# Patient Record
Sex: MALE | Race: WHITE | NOT HISPANIC OR LATINO | Employment: OTHER | URBAN - METROPOLITAN AREA
[De-identification: names, ages, dates, MRNs, and addresses within clinical notes are randomized per-mention and may not be internally consistent; named-entity substitution may affect disease eponyms.]

---

## 2017-05-02 ENCOUNTER — LAB VISIT (OUTPATIENT)
Dept: LAB | Facility: HOSPITAL | Age: 57
End: 2017-05-02
Attending: INTERNAL MEDICINE
Payer: OTHER GOVERNMENT

## 2017-05-02 DIAGNOSIS — D64.9 ANEMIA, UNSPECIFIED TYPE: ICD-10-CM

## 2017-05-02 LAB
ALBUMIN SERPL BCP-MCNC: 4.9 G/DL
ALP SERPL-CCNC: 89 U/L
ALT SERPL W/O P-5'-P-CCNC: 51 U/L
ANION GAP SERPL CALC-SCNC: 10 MMOL/L
AST SERPL-CCNC: 41 U/L
BASOPHILS # BLD AUTO: 0.05 K/UL
BASOPHILS NFR BLD: 0.7 %
BILIRUB SERPL-MCNC: 0.7 MG/DL
BUN SERPL-MCNC: 12 MG/DL
CALCIUM SERPL-MCNC: 9.4 MG/DL
CHLORIDE SERPL-SCNC: 104 MMOL/L
CO2 SERPL-SCNC: 29 MMOL/L
CREAT SERPL-MCNC: 0.8 MG/DL
DIFFERENTIAL METHOD: ABNORMAL
EOSINOPHIL # BLD AUTO: 0.3 K/UL
EOSINOPHIL NFR BLD: 3.8 %
ERYTHROCYTE [DISTWIDTH] IN BLOOD BY AUTOMATED COUNT: 12.6 %
EST. GFR  (AFRICAN AMERICAN): >60 ML/MIN/1.73 M^2
EST. GFR  (NON AFRICAN AMERICAN): >60 ML/MIN/1.73 M^2
GLUCOSE SERPL-MCNC: 105 MG/DL
HCT VFR BLD AUTO: 39.3 %
HGB BLD-MCNC: 13.8 G/DL
LYMPHOCYTES # BLD AUTO: 2.9 K/UL
LYMPHOCYTES NFR BLD: 42.8 %
MCH RBC QN AUTO: 33.6 PG
MCHC RBC AUTO-ENTMCNC: 35.1 %
MCV RBC AUTO: 96 FL
MONOCYTES # BLD AUTO: 0.5 K/UL
MONOCYTES NFR BLD: 7 %
NEUTROPHILS # BLD AUTO: 3.1 K/UL
NEUTROPHILS NFR BLD: 45.7 %
NRBC BLD-RTO: 0 /100 WBC
PLATELET # BLD AUTO: 246 K/UL
PMV BLD AUTO: 8.7 FL
POTASSIUM SERPL-SCNC: 4.1 MMOL/L
PROT SERPL-MCNC: 7.9 G/DL
RBC # BLD AUTO: 4.11 M/UL
SODIUM SERPL-SCNC: 143 MMOL/L
URATE SERPL-MCNC: 6.8 MG/DL
WBC # BLD AUTO: 6.83 K/UL

## 2017-05-02 PROCEDURE — 80053 COMPREHEN METABOLIC PANEL: CPT | Mod: PN

## 2017-05-02 PROCEDURE — 83520 IMMUNOASSAY QUANT NOS NONAB: CPT

## 2017-05-02 PROCEDURE — 85025 COMPLETE CBC W/AUTO DIFF WBC: CPT | Mod: PN

## 2017-05-02 PROCEDURE — 84165 PROTEIN E-PHORESIS SERUM: CPT

## 2017-05-02 PROCEDURE — 84550 ASSAY OF BLOOD/URIC ACID: CPT

## 2017-05-02 PROCEDURE — 36415 COLL VENOUS BLD VENIPUNCTURE: CPT | Mod: PN

## 2017-05-02 PROCEDURE — 85025 COMPLETE CBC W/AUTO DIFF WBC: CPT

## 2017-05-02 PROCEDURE — 80053 COMPREHEN METABOLIC PANEL: CPT

## 2017-05-02 PROCEDURE — 82784 ASSAY IGA/IGD/IGG/IGM EACH: CPT | Mod: 59

## 2017-05-02 PROCEDURE — 84165 PROTEIN E-PHORESIS SERUM: CPT | Mod: 26,,, | Performed by: PATHOLOGY

## 2017-05-02 PROCEDURE — 84550 ASSAY OF BLOOD/URIC ACID: CPT | Mod: PN

## 2017-05-03 LAB
ALBUMIN SERPL ELPH-MCNC: 4.64 G/DL
ALPHA1 GLOB SERPL ELPH-MCNC: 0.22 G/DL
ALPHA2 GLOB SERPL ELPH-MCNC: 0.68 G/DL
B-GLOBULIN SERPL ELPH-MCNC: 0.78 G/DL
GAMMA GLOB SERPL ELPH-MCNC: 1.19 G/DL
IGA SERPL-MCNC: 163 MG/DL
IGG SERPL-MCNC: 1195 MG/DL
IGM SERPL-MCNC: 115 MG/DL
KAPPA LC SER QL IA: 1.41 MG/DL
KAPPA LC/LAMBDA SER IA: 1.37
LAMBDA LC SER QL IA: 1.03 MG/DL
PROT SERPL-MCNC: 7.5 G/DL

## 2017-05-04 LAB — PATHOLOGIST INTERPRETATION SPE: NORMAL

## 2017-05-26 ENCOUNTER — TELEPHONE (OUTPATIENT)
Dept: GASTROENTEROLOGY | Facility: CLINIC | Age: 57
End: 2017-05-26

## 2017-05-26 NOTE — TELEPHONE ENCOUNTER
Spoke with pt's wife, Sailaja. Sailaja states pt received recall letter in mail for scopes. Informed that Annita is the nurse who schedules the scopes for Dr. Crenshaw is out of office today and that once she returns she will get a call back to schedule. Sailaja verbalized understanding.     Please call Sailaja at [phone] to schedule scopes for pt.

## 2017-05-26 NOTE — TELEPHONE ENCOUNTER
----- Message from Bar Garcia sent at 5/26/2017  9:17 AM CDT -----  Contact: pt's wife Sailaja Mederos needs to schedule a colonoscopy and EGD  Call Back#[phone]  Thanks

## 2017-10-27 PROBLEM — L84 CALLUS OF FOOT: Status: ACTIVE | Noted: 2017-10-27

## 2017-10-27 PROBLEM — B35.1 ONYCHOMYCOSIS DUE TO DERMATOPHYTE: Status: ACTIVE | Noted: 2017-10-27

## 2017-11-11 PROBLEM — E83.52 HYPERCALCEMIA: Status: ACTIVE | Noted: 2017-11-11

## 2018-01-12 PROBLEM — K76.0 FATTY LIVER: Status: ACTIVE | Noted: 2018-01-12

## 2018-03-21 ENCOUNTER — TELEPHONE (OUTPATIENT)
Dept: GASTROENTEROLOGY | Facility: CLINIC | Age: 58
End: 2018-03-21

## 2018-03-21 ENCOUNTER — PATIENT MESSAGE (OUTPATIENT)
Dept: GASTROENTEROLOGY | Facility: CLINIC | Age: 58
End: 2018-03-21

## 2018-03-21 NOTE — TELEPHONE ENCOUNTER
----- Message from Bassam Valdez sent at 3/21/2018 12:17 PM CDT -----  Contact: Wife/Sailaja  Sailaja called in regarding the attached patient () and stated they received notification in the mail that it was time, in April, to scheduled patients EGD & colonoscopy.  Sailaja's call back number is [phone]

## 2018-03-28 ENCOUNTER — PATIENT MESSAGE (OUTPATIENT)
Dept: GASTROENTEROLOGY | Facility: CLINIC | Age: 58
End: 2018-03-28

## 2018-03-28 ENCOUNTER — TELEPHONE (OUTPATIENT)
Dept: GASTROENTEROLOGY | Facility: CLINIC | Age: 58
End: 2018-03-28

## 2018-03-28 NOTE — TELEPHONE ENCOUNTER
----- Message from Viry Reveles sent at 3/28/2018 12:36 PM CDT -----  Contact: Sailaja  Calling to get a colonoscopy and egd before the end of April. They will be moving. She is asking for a call [phone] and [phone]

## 2018-03-28 NOTE — TELEPHONE ENCOUNTER
----- Message from Viry Reveles sent at 3/28/2018 12:36 PM CDT -----  Contact: Sailaja  Calling to get a colonoscopy and egd before the end of April. They will be moving. She is asking for a call [phone] and [phone]

## 2018-04-03 ENCOUNTER — PATIENT MESSAGE (OUTPATIENT)
Dept: GASTROENTEROLOGY | Facility: CLINIC | Age: 58
End: 2018-04-03

## 2018-04-04 ENCOUNTER — PATIENT MESSAGE (OUTPATIENT)
Dept: ENDOSCOPY | Facility: HOSPITAL | Age: 58
End: 2018-04-04

## 2018-05-07 ENCOUNTER — PATIENT MESSAGE (OUTPATIENT)
Dept: GASTROENTEROLOGY | Facility: CLINIC | Age: 58
End: 2018-05-07

## 2018-05-07 ENCOUNTER — PATIENT MESSAGE (OUTPATIENT)
Dept: ENDOSCOPY | Facility: HOSPITAL | Age: 58
End: 2018-05-07

## 2018-05-22 ENCOUNTER — TELEPHONE (OUTPATIENT)
Dept: GASTROENTEROLOGY | Facility: CLINIC | Age: 58
End: 2018-05-22

## 2018-05-22 NOTE — TELEPHONE ENCOUNTER
Received approval for pt to hold Effient from Dr Cade's office.  Note says this was discussed with pt

## 2018-05-23 NOTE — H&P
History & Physical - Short Stay  Gastroenterology      SUBJECTIVE:     Procedure: Gastroscopy and Colonoscopy    Chief Complaint/Indication for Procedure:  Dysphagia.  Hx of colon polyps.    History of Present Illness:  Todd Lobo LPN      3/21/18 3:06 PM   Note      ----- Message from Bassam Valdez sent at 3/21/2018 12:17 PM CDT -----  Contact: Wife/Sailaja  Sailaja called in regarding the attached patient () and stated they received notification in the mail that it was time, in April, to scheduled patients EGD & colonoscopy.  Sailaja's call back number is [phone]          Adair Leigh   to Ahmetservando NAVARRO MATHEW Lobo      3/22/2018  10:33 AM   Sorry I missed your call. Please try my wifes phone at [phone]. I have a history of esophageal stricture and have been having some intermittent problems with swallowing. Would like to have EGD with dilitaton at the same time as colonoscopy.  Thanks, Fam         Office Visit     [facility]11/2018  [facility]   Controlled type 2 diabetes mellitus without complication, without long-term current use of insulin +6 more   Dx   Follow-up ; Referred by Aaareferral Self   Reason for Visit    Progress Notes        Subjective:       Patient ID: Adair Leigh is a 57 y.o. male.     Chief Complaint: Follow-up (6 month f/u Hx : DM II)     HPI   Patient here for follow up. Planning to travel extensively in . Gets muscle cramps. On atorvastatin and co q 10.     Assessment:       1. Controlled type 2 diabetes mellitus without complication, without long-term current use of insulin    2. Essential hypertension    3. Fatty liver    4. Gastroesophageal reflux disease without esophagitis    5. Coronary artery disease involving native heart without angina pectoris, unspecified vessel or lesion type    6. Esophageal dysphagia        Plan:       Controlled type 2 diabetes mellitus without complication, without long-term  current use of insulin     Essential hypertension     Fatty liver     Gastroesophageal reflux disease without esophagitis     Coronary artery disease involving native heart without angina pectoris, unspecified vessel or lesion type     Esophageal dysphagia     Muscle cramps            Discussed management of dm, of rx , supplies and labs while traveling in . Explained the muscle cramps could be due to statin. Trial of statin period observation discussed.               PTA Medications   Medication Sig    atenolol (TENORMIN) 50 MG tablet TAKE 1 TABLET DAILY    atorvastatin (LIPITOR) 40 MG tablet TAKE 1 TABLET DAILY    chromium picolinate 1,000 mcg Tab Take 1 tablet by mouth.    ezetimibe (ZETIA) 10 mg tablet TAKE 1 TABLET DAILY    folic acid (FOLVITE) 400 MCG tablet Take 400 mcg by mouth once daily.    ibuprofen (ADVIL,MOTRIN) 800 MG tablet TAKE 1 TABLET EVERY 8 HOURS AS NEEDED FOR PAIN    omega-3 acid ethyl esters (LOVAZA) 1 gram capsule TAKE 4 CAPSULES DAILY    selenium 50 mcg Tab Take 200 mcg by mouth once daily.    amlodipine-valsartan (EXFORGE)  mg per tablet TAKE 1 TABLET DAILY    blood sugar diagnostic (FREESTYLE LITE STRIPS) Strp 1 strip by Misc.(Non-Drug; Combo Route) route 2 (two) times daily.    EFFIENT 10 mg Tab TAKE 1 TABLET DAILY    nitroGLYCERIN (NITROSTAT) 0.4 MG SL tablet Place 0.4 mg under the tongue every 5 (five) minutes as needed.     SITagliptin (JANUVIA) 50 MG Tab Take 1 tablet (50 mg total) by mouth once daily.    VIAGRA 100 mg tablet TAKE 1 TABLET DAILY AS NEEDED FOR ERECTILE DYSFUNCTION    zolpidem (AMBIEN) 10 mg Tab Take 1 tablet (10 mg total) by mouth nightly as needed.       Review of patient's allergies indicates:   Allergen Reactions    Iodinated contrast- oral and iv dye Hives    Plavix [clopidogrel] Hives        Past Medical History:   Diagnosis Date    Coronary artery disease     Diabetes mellitus     Family history of coronary artery disease 3/30/2015     Hypertension     S/P CABG (coronary artery bypass graft) 4/14/2015     Past Surgical History:   Procedure Laterality Date    APPENDECTOMY      APPENDECTOMY      CARDIAC CATHETERIZATION      CARDIAC SURGERY      stents x 9    COLONOSCOPY  1/29/2007  [facility]    Polyp on the ileocecal valve, hot biopsied.   Adenomatous polyp with low grade dysplasia.  A couple of small polyps cauterized and ablated throughout the transverse and descending colon.  Small polyps in the sigmoid and rectum cauterized and ablated without biopsies.  Mild internal hemorrhoids.      COLONOSCOPY  6/9/2009  [facility]    Non-bleeding internal hemorrhoids.   The entire colon is otherwise normal.      COLONOSCOPY W/ POLYPECTOMY  03/04/2013    [facility].   One 2 mm polyp in the distal sigmoid colon.  TUBULAR ADENOMA.  Mild colonic spasm consistent with IBS.  The examination was otherwise normal.      CORONARY ANGIOPLASTY      CORONARY ARTERY BYPASS GRAFT  4/1/2015    [facility]    TONSILLECTOMY      TONSILLECTOMY      UPPER GASTROINTESTINAL ENDOSCOPY  1/29/2007  [facility]    Mild esophageal stenosis with what appeared to be spasm, biopsied and dilated with a 54 Savory.   The histopathologic changes are compatible with reflux esophagitis.    Gastritis, diffuse with antral erosions, moderate to severe.   Mild active gastritis. No helicobacter seen.    Mild duodenitis.   Mild eosinophilia.  Plasma cells and lymphocytes donot appear increased.  Intraepithelial lymphocytes    UPPER GASTROINTESTINAL ENDOSCOPY  6/9/2009  [facility]    Reflux esophagitis.   1-2 mm Antral polyp.   HYPERPLASTIC POLYP.   Normal examined duodenum.  BROOK Test  Negative.    UPPER GASTROINTESTINAL ENDOSCOPY  01/16/2014    [facility].   Slight non-erosive esophagitis.  Benign stricture, dilated 42 Fr.  (Did not use any larger due to the Effient).  Erythematous mucosa in the prepyloric region.       Family History   Problem Relation Age of Onset    Hypertension Mother     Heart disease  "Father     Hypertension Father     Heart disease Brother      Social History   Substance Use Topics    Smoking status: Former Smoker     Packs/day: 1.00     Years: 15.00     Quit date: 1/10/1995    Smokeless tobacco: Former User    Alcohol use Yes      Comment: 4/week         OBJECTIVE:     Vital Signs (Most Recent)  Temp: 97.9 °F (36.6 °C) (05/24/18 1119)  Pulse: (!) 56 (05/24/18 1119)  Resp: 16 (05/24/18 1119)  BP: (!) 147/76 (05/24/18 1119)  SpO2: 99 % (05/24/18 1119)    Physical Exam:          : Ht 5' 10" (1.778 m)   Wt 72.9 kg (160 lb 12.8 oz)   BMI 23.07 kg/m²                                               GENERAL:  Comfortable, in no acute distress.                                 HEENT EXAM:  Nonicteric.  No adenopathy.  Oropharynx is clear.               NECK:  Supple.                                                               LUNGS:  Clear.                                                               CARDIAC:  Regular rate and rhythm.  S1, S2.  No murmur.                      ABDOMEN:  Soft, positive bowel sounds, nontender.  No hepatosplenomegaly or masses.  No rebound or guarding.                                             EXTREMITIES:  No edema.     MENTAL STATUS:  Alert and oriented.    ASSESSMENT/PLAN:     Assessment: Dysphagia.  Hx of colon polyps.    Plan: Gastroscopy and Colonoscopy    Anesthesia Plan:   MAC / General Anaesthesia    ASA Grade: ASA 2 - Patient with mild systemic disease with no functional limitations    MALLAMPATI SCORE: I (soft palate, uvula, fauces, and tonsillar pillars visible)    "

## 2018-05-24 ENCOUNTER — ANESTHESIA (OUTPATIENT)
Dept: ENDOSCOPY | Facility: HOSPITAL | Age: 58
End: 2018-05-24
Payer: OTHER GOVERNMENT

## 2018-05-24 ENCOUNTER — SURGERY (OUTPATIENT)
Age: 58
End: 2018-05-24

## 2018-05-24 ENCOUNTER — HOSPITAL ENCOUNTER (OUTPATIENT)
Facility: HOSPITAL | Age: 58
Discharge: HOME | End: 2018-05-24
Attending: INTERNAL MEDICINE | Admitting: INTERNAL MEDICINE
Payer: OTHER GOVERNMENT

## 2018-05-24 ENCOUNTER — ANESTHESIA EVENT (OUTPATIENT)
Dept: ENDOSCOPY | Facility: HOSPITAL | Age: 58
End: 2018-05-24
Payer: OTHER GOVERNMENT

## 2018-05-24 VITALS
SYSTOLIC BLOOD PRESSURE: 139 MMHG | DIASTOLIC BLOOD PRESSURE: 81 MMHG | BODY MASS INDEX: 22.19 KG/M2 | HEIGHT: 70 IN | WEIGHT: 155 LBS | TEMPERATURE: 98 F | HEART RATE: 56 BPM | OXYGEN SATURATION: 99 % | RESPIRATION RATE: 16 BRPM

## 2018-05-24 DIAGNOSIS — R13.10 DYSPHAGIA: ICD-10-CM

## 2018-05-24 LAB
GLUCOSE SERPL-MCNC: 89 MG/DL (ref 70–110)
H PYLORI INDEX VALUE: NEGATIVE

## 2018-05-24 PROCEDURE — 82962 GLUCOSE BLOOD TEST: CPT | Mod: PO | Performed by: INTERNAL MEDICINE

## 2018-05-24 PROCEDURE — 43248 EGD GUIDE WIRE INSERTION: CPT | Mod: PO | Performed by: INTERNAL MEDICINE

## 2018-05-24 PROCEDURE — 43239 EGD BIOPSY SINGLE/MULTIPLE: CPT | Mod: PO | Performed by: INTERNAL MEDICINE

## 2018-05-24 PROCEDURE — 43239 EGD BIOPSY SINGLE/MULTIPLE: CPT | Mod: 59,,, | Performed by: INTERNAL MEDICINE

## 2018-05-24 PROCEDURE — 00813 ANES UPR LWR GI NDSC PX: CPT | Mod: PO | Performed by: INTERNAL MEDICINE

## 2018-05-24 PROCEDURE — 37000008 HC ANESTHESIA 1ST 15 MINUTES: Mod: PO | Performed by: INTERNAL MEDICINE

## 2018-05-24 PROCEDURE — D9220A PRA ANESTHESIA: Mod: ANES,,, | Performed by: ANESTHESIOLOGY

## 2018-05-24 PROCEDURE — 43248 EGD GUIDE WIRE INSERTION: CPT | Mod: 51,,, | Performed by: INTERNAL MEDICINE

## 2018-05-24 PROCEDURE — 63600175 PHARM REV CODE 636 W HCPCS: Mod: PO | Performed by: NURSE ANESTHETIST, CERTIFIED REGISTERED

## 2018-05-24 PROCEDURE — 37000009 HC ANESTHESIA EA ADD 15 MINS: Mod: PO | Performed by: INTERNAL MEDICINE

## 2018-05-24 PROCEDURE — 45380 COLONOSCOPY AND BIOPSY: CPT | Mod: PO | Performed by: INTERNAL MEDICINE

## 2018-05-24 PROCEDURE — D9220A PRA ANESTHESIA: Mod: CRNA,,, | Performed by: NURSE ANESTHETIST, CERTIFIED REGISTERED

## 2018-05-24 PROCEDURE — 27201012 HC FORCEPS, HOT/COLD, DISP: Mod: PO | Performed by: INTERNAL MEDICINE

## 2018-05-24 PROCEDURE — 88305 TISSUE EXAM BY PATHOLOGIST: CPT | Mod: 59 | Performed by: PATHOLOGY

## 2018-05-24 PROCEDURE — 45380 COLONOSCOPY AND BIOPSY: CPT | Mod: PT,,, | Performed by: INTERNAL MEDICINE

## 2018-05-24 PROCEDURE — 88305 TISSUE EXAM BY PATHOLOGIST: CPT | Mod: 26,,, | Performed by: PATHOLOGY

## 2018-05-24 PROCEDURE — 87449 NOS EACH ORGANISM AG IA: CPT | Mod: PO | Performed by: INTERNAL MEDICINE

## 2018-05-24 RX ORDER — SODIUM CHLORIDE, SODIUM LACTATE, POTASSIUM CHLORIDE, CALCIUM CHLORIDE 600; 310; 30; 20 MG/100ML; MG/100ML; MG/100ML; MG/100ML
INJECTION, SOLUTION INTRAVENOUS CONTINUOUS
Status: DISCONTINUED | OUTPATIENT
Start: 2018-05-24 | End: 2018-05-24 | Stop reason: HOSPADM

## 2018-05-24 RX ORDER — LIDOCAINE HCL/PF 100 MG/5ML
SYRINGE (ML) INTRAVENOUS
Status: DISCONTINUED | OUTPATIENT
Start: 2018-05-24 | End: 2018-05-24

## 2018-05-24 RX ORDER — PROPOFOL 10 MG/ML
VIAL (ML) INTRAVENOUS
Status: DISCONTINUED | OUTPATIENT
Start: 2018-05-24 | End: 2018-05-24

## 2018-05-24 RX ORDER — PANTOPRAZOLE SODIUM 40 MG/1
40 TABLET, DELAYED RELEASE ORAL
Qty: 90 TABLET | Refills: 3 | Status: SHIPPED | OUTPATIENT
Start: 2018-05-24 | End: 2019-10-25 | Stop reason: SDUPTHER

## 2018-05-24 RX ADMIN — PROPOFOL 30 MG: 10 INJECTION, EMULSION INTRAVENOUS at 12:05

## 2018-05-24 RX ADMIN — PROPOFOL 30 MG: 10 INJECTION, EMULSION INTRAVENOUS at 11:05

## 2018-05-24 RX ADMIN — LIDOCAINE HYDROCHLORIDE 100 MG: 20 INJECTION, SOLUTION INTRAVENOUS at 11:05

## 2018-05-24 RX ADMIN — SODIUM CHLORIDE, SODIUM LACTATE, POTASSIUM CHLORIDE, CALCIUM CHLORIDE: 600; 310; 30; 20 INJECTION, SOLUTION INTRAVENOUS at 11:05

## 2018-05-24 NOTE — DISCHARGE INSTRUCTIONS
Recovery After Procedural Sedation (Adult)  You have been given medicine by vein to make you sleep during your surgery. This may have included both a pain medicine and sleeping medicine. Most of the effects have worn off. But you may still have some drowsiness for the next 6 to 8 hours.  Home care  Follow these guidelines when you get home:  · For the next 8 hours, you should be watched by a responsible adult. This person should make sure your condition is not getting worse.  · Don't drink any alcohol for the next 24 hours.  · Don't drive, operate dangerous machinery, or make important business or personal decisions during the next 24 hours.  Note: Your healthcare provider may tell you not to take any medicine by mouth for pain or sleep in the next 4 hours. These medicines may react with the medicines you were given in the hospital. This could cause a much stronger response than usual.  Follow-up care  Follow up with your healthcare provider if you are not alert and back to your usual level of activity within 12 hours.  When to seek medical advice  Call your healthcare provider right away if any of these occur:  · Drowsiness gets worse  · Weakness or dizziness gets worse  · Repeated vomiting  · You can't be awakened   Date Last Reviewed: 10/18/2016  © 9025-6035 [employer]. [address] [place] [address], [place] [address]. All rights reserved. This information is not intended as a substitute for professional medical care. Always follow your healthcare professional's instructions.        Tips to Control Acid Reflux    To control acid reflux, youll need to make some basic diet and lifestyle changes. The simple steps outlined below may be all youll need to ease discomfort.  Watch what you eat  · Avoid fatty foods and spicy foods.  · Eat fewer acidic foods, such as citrus and tomato-based foods. These can increase symptoms.  · Limit drinking alcohol, caffeine, and fizzy beverages. All increase acid  reflux.  · Try limiting chocolate, peppermint, and spearmint. These can worsen acid reflux in some people.  Watch when you eat  · Avoid lying down for 3 hours after eating.  · Do not snack before going to bed.  Raise your head  Raising your head and upper body by 4 to 6 inches helps limit reflux when youre lying down. Put blocks under the head of your bed frame to raise it.  Other changes  · Lose weight, if you need to  · Dont exercise near bedtime  · Avoid tight-fitting clothes  · Limit aspirin and ibuprofen  · Stop smoking   Date Last Reviewed: 7/1/2016 © 2000-2017 [employer]. [address] [place] [address], [place] [address]. All rights reserved. This information is not intended as a substitute for professional medical care. Always follow your healthcare professional's instructions.        High-Fiber Diet  Fiber is in fruits, vegetables, cereals, and grains. Fiber passes through your body undigested. A high-fiber diet helps food move through your intestinal tract. The added bulk is helpful in preventing constipation. In people with diverticulosis, fiber helps clean out the pouches along the colon wall. It also prevents new pouches from forming. A high-fiber diet reduces the risk of colon cancer. It also lowers blood cholesterol and prevents high blood sugar in people with diabetes.    The fiber-rich foods listed below should be part of your diet. If you are not used to high-fiber foods, start with 1 or 2 foods from this list. Every 3 to 4 days add a new one to your diet. Do this until you are eating 4 high-fiber foods per day. This should give you 20 to 35 grams of fiber a day. It is also important to drink a lot of water when you are on this diet. You should have 6 to 8 glasses of water a day. Water makes the fiber swell and increases the benefit.  Foods high in dietary fiber  The following foods are high in dietary fiber:  · Breads. Breads made with 100% whole-wheat flour; delmi, wheat, or rye  crackers; whole-grain tortillas, bran muffins.  · Cereals. Whole-grain and bran cereals with bran (shredded wheat, wheat flakes, raisin bran, corn bran); oatmeal, rolled oats, granola, and brown rice.  · Fruits. Fresh fruits and their edible skins (pears, prunes, raisins, berries, apples, and apricots); bananas, citrus fruit, mangoes, pineapple; and prune juice.  · Nuts. Any nuts and seeds.  · Vegetables. Best served raw or lightly cooked. All types, especially: green peas, celery, eggplant, potatoes, spinach, broccoli, Lackey sprouts, winter squash, carrots, cauliflower, soybeans, lentils, and fresh and dried beans of all kinds.  · Other. Popcorn, any spices.  Date Last Reviewed: 8/1/2016  © 1797-6183 [employer]. [address] [place] [address], [place] [address]. All rights reserved. This information is not intended as a substitute for professional medical care. Always follow your healthcare professional's instructions.

## 2018-05-24 NOTE — BRIEF OP NOTE
Discharge Note  Short Stay      SUMMARY     Admit Date: 5/24/2018    Attending Physician: Ricky Crenshaw Jr., MD     Discharge Physician: Ricky Crenshaw Jr., MD    Discharge Date: 5/24/2018 1:03 PM    Final Diagnosis: Reflux esophagitis [K21.0]  Screen for colon cancer [Z12.11]  Personal history of colonic polyps [Z86.010]    Impression:          - Normal oropharynx.                       - Normal upper third of esophagus and middle third                        of esophagus.                       - Reflux esophagitis.                       - Z-line regular, 39 cm from the incisors.                       - Benign-appearing esophageal stenosis. Dilated, 51                        Fr.                       - Non-erosive esophageal reflux (NERD) disease.                       - Minimal antritis. Biopsied.                       - Normal stomach otherwise. Biopsied.                       - Normal examined duodenum.  Recommendation:      - Perform a colonoscopy as previously scheduled.                       - Discharge patient to home.                       - Await pathology results.                       - Follow an antireflux regimen.                       - Use Protonix (pantoprazole) 40 mg PO daily.                       - Repeat upper endoscopy PRN for retreatment.    Impression:          - One 2 mm polyp in the mid ascending colon, removed                        piecemeal using a cold biopsy forceps. Resected and                        retrieved.                       - Non-bleeding internal hemorrhoids.                       - The examination was otherwise normal.                       - The examined portion of the ileum was normal.  Recommendation:      - Discharge patient to home.                       - Await pathology results.                       - If the pathology report reveals adenomatous                        tissue, then repeat the colonoscopy for surveillance                        in 5 years.                        - If the pathology report indicates hyperplastic                        polyp, then repeat colonoscopy for surveillance in 7                        years.                       - High fiber diet.                       - Call the [facility] in 2 weeks for reports (if                        you haven't heard from us sooner) [phone].                       - Continue present medications.    Ricky Crenshaw MD  5/24/2018   Disposition: HOME OR SELF CARE    Patient Instructions:   Current Discharge Medication List      CONTINUE these medications which have NOT CHANGED    Details   atenolol (TENORMIN) 50 MG tablet TAKE 1 TABLET DAILY  Qty: 90 tablet, Refills: 3      atorvastatin (LIPITOR) 40 MG tablet TAKE 1 TABLET DAILY  Qty: 90 tablet, Refills: 3      chromium picolinate 1,000 mcg Tab Take 1 tablet by mouth.      ezetimibe (ZETIA) 10 mg tablet TAKE 1 TABLET DAILY  Qty: 90 tablet, Refills: 3      folic acid (FOLVITE) 400 MCG tablet Take 400 mcg by mouth once daily.      ibuprofen (ADVIL,MOTRIN) 800 MG tablet TAKE 1 TABLET EVERY 8 HOURS AS NEEDED FOR PAIN  Qty: 270 tablet, Refills: 3      omega-3 acid ethyl esters (LOVAZA) 1 gram capsule TAKE 4 CAPSULES DAILY  Qty: 360 capsule, Refills: 2      selenium 50 mcg Tab Take 200 mcg by mouth once daily.      amlodipine-valsartan (EXFORGE)  mg per tablet TAKE 1 TABLET DAILY  Qty: 90 tablet, Refills: 3      blood sugar diagnostic (FREESTYLE LITE STRIPS) Strp 1 strip by Misc.(Non-Drug; Combo Route) route 2 (two) times daily.  Qty: 200 strip, Refills: 3      EFFIENT 10 mg Tab TAKE 1 TABLET DAILY  Qty: 90 tablet, Refills: 3      nitroGLYCERIN (NITROSTAT) 0.4 MG SL tablet Place 0.4 mg under the tongue every 5 (five) minutes as needed.       SITagliptin (JANUVIA) 50 MG Tab Take 1 tablet (50 mg total) by mouth once daily.  Qty: 90 tablet, Refills: 3      VIAGRA 100 mg tablet TAKE 1 TABLET DAILY AS NEEDED FOR ERECTILE DYSFUNCTION  Qty: 18 tablet, Refills: 3       zolpidem (AMBIEN) 10 mg Tab Take 1 tablet (10 mg total) by mouth nightly as needed.  Qty: 30 tablet, Refills: 0             Discharge Procedure Orders (must include Diet, Follow-up, Activity)    Follow Up:  Follow up with PCP as per your routine.  Please follow an anti reflux diet and a high fiber diet.  Activity as tolerated.    No driving day of procedure.

## 2018-05-24 NOTE — ANESTHESIA PREPROCEDURE EVALUATION
05/24/2018  Adair Leigh is a 57 y.o., male.    Anesthesia Evaluation      I have reviewed the Medications.     Review of Systems  Anesthesia Hx:  No problems with previous Anesthesia   Social:  Non-Smoker, No Alcohol Use    Cardiovascular:   Hypertension CAD  CABG/stent (CABG 2015, stents 2015)     Pulmonary:  Pulmonary Normal    Renal/:  Renal/ Normal     Hepatic/GI:   GERD    Neurological:  Neurology Normal    Endocrine:   Diabetes        Physical Exam  General:  Well nourished    Airway/Jaw/Neck:  Airway Findings: Mouth Opening: Normal General Airway Assessment: Adult  Mallampati: II  Jaw/Neck Findings:  Neck ROM: Extension Decreased, Mild       Chest/Lungs:  Chest/Lungs Findings: Clear to auscultation, Normal Respiratory Rate     Heart/Vascular:  Heart Findings: Rate: Normal  Rhythm: Regular Rhythm  Sounds: Normal  Heart murmur: negative Vascular Findings: Normal (No carotid bruits.)       Mental Status:  Mental Status Findings:  Cooperative, Alert and Oriented         Anesthesia Plan  Type of Anesthesia, risks & benefits discussed:  Anesthesia Type:  general  Patient's Preference:   Intra-op Monitoring Plan:   Intra-op Monitoring Plan Comments:   Post Op Pain Control Plan:   Post Op Pain Control Plan Comments:   Induction:   IV  Beta Blocker:  Patient is on a Beta-Blocker and has received one dose within the past 24 hours (No further documentation required).       Informed Consent: Patient understands risks and agrees with Anesthesia plan.  Questions answered. Anesthesia consent signed with patient.  ASA Score: 3     Day of Surgery Review of History & Physical:        Anesthesia Plan Notes: Propofol general.        Ready For Surgery From Anesthesia Perspective.

## 2018-05-24 NOTE — [employer]ATION PATIENT INSTRUCTIONS
Discharge Summary/Instructions for after Colonoscopy with   Biopsy/Polypectomy  Adair Leigh    Thursday, May 24, 2018  Ricky Crenshaw MD  RESTRICTIONS ON ACTIVITY:  - Do not drive a car or operate machinery until the day after the procedure.      - The following day: return to full activity including work.  - For  3 days: No heavy lifting, straining or running.  - Diet: You can have solid foods, but no gassy foods (i.e. beans, broccoli,   cabbage, etc).  TREATMENT FOR COMMON SIDE EFFECTS:  - Mild abdominal pain and bloating or excessive gas: rest, eat lightly and   use a heating pad.  SYMPTOMS TO WATCH FOR AND REPORT TO YOUR PHYSICIAN:  1. Severe abdominal pain.  2. Fever within 24 hours after a procedure.  3. A large amount of rectal bleeding. (A small amount of blood from the   rectum is not serious, especially if hemorrhoids are present.  3.  Because air was put into your colon during the procedure, expelling   large amounts of air from your rectum is normal.  4.  You may not have a bowel movement for 1-3 days because of the   colonoscopy prep.  This is normal.  5.  Call immediately if you notice any of the following:   Chills and/or fever over 101   Persistent vomiting   Severe abdominal pain, other than gas cramps   Severe chest pain   Black, tarry stools   Any bleeding - exceeding one tablespoon  Your doctor recommends these additional instructions:  We are waiting for your pathology results.   If the pathology report reveals adenomatous (precancerous) tissue, then your   physician recommends a repeat colonoscopy for surveillance in 5 years.   If the pathology report indicates a hyperplastic polyp, then the colonoscopy   will be repeated for surveillance in 7-8 years.   Eat a high fiber diet.  None  If you have any questions or problems, please call your physician.  EMERGENCY PHONE NUMBER: [phone]  LAB RESULTS: Call in two (2) weeks for lab results,  [phone]  ___________________________________________  Nurse Signature  ___________________________________________  Patient/Designated Responsible Party Signature  Ricky Crenshaw MD  5/24/2018 1:02:23 PM  This report has been verified and signed electronically.  [employer]

## 2018-05-24 NOTE — ANESTHESIA POSTPROCEDURE EVALUATION
"Anesthesia Post Evaluation    Patient: Adair Leigh    Procedure(s) Performed: Procedure(s) (LRB):  ESOPHAGOGASTRODUODENOSCOPY (EGD) (N/A)  COLONOSCOPY (N/A)    Final Anesthesia Type: general  Patient location during evaluation: PACU  Patient participation: Yes- Able to Participate  Level of consciousness: awake and alert  Post-procedure vital signs: reviewed and stable  Pain management: adequate  Airway patency: patent  PONV status at discharge: No PONV  Anesthetic complications: no      Cardiovascular status: hemodynamically stable and blood pressure returned to baseline  Respiratory status: unassisted, spontaneous ventilation and room air  Hydration status: euvolemic  Follow-up not needed.        Visit Vitals  /81 (BP Location: Left arm, Patient Position: Lying)   Pulse (!) 56   Temp 36.7 °C (98 °F) (Skin)   Resp 16   Ht 5' 10" (1.778 m)   Wt 70.3 kg (155 lb)   SpO2 99%   BMI 22.24 kg/m²       Pain/Jessica Score: Pain Assessment Performed: Yes (5/24/2018  1:29 PM)  Presence of Pain: denies (5/24/2018  1:29 PM)  Jessica Score: 10 (5/24/2018  1:29 PM)      "

## 2018-08-13 PROBLEM — M79.641 PAIN IN BOTH HANDS: Status: ACTIVE | Noted: 2018-08-13

## 2018-08-13 PROBLEM — M79.642 PAIN IN BOTH HANDS: Status: ACTIVE | Noted: 2018-08-13

## 2018-09-18 ENCOUNTER — TELEPHONE (OUTPATIENT)
Dept: RHEUMATOLOGY | Facility: CLINIC | Age: 58
End: 2018-09-18

## 2018-09-18 NOTE — TELEPHONE ENCOUNTER
----- Message from Otf Garcia sent at 9/18/2018 11:21 AM CDT -----  Type:  Sooner Apoointment Request    Caller is requesting a sooner appointment.  Caller declined first available appointment listed below.  Caller will not accept being placed on the waitlist and is requesting a message be sent to doctor.    Name of Caller:  Patient  When is the first available appointment?  Na  Best Call Back Number:  [phone]  Additional Information:  Referral is in the computer - please call to schedule new patient appointment. Patient is traveling so leave message.    Patient travels in an RV and was hoping for something within the next 6 weeks. Patient will see Dr. Alas the hand specialist instead for now. CG

## 2018-11-15 ENCOUNTER — IMMUNIZATION (OUTPATIENT)
Dept: PHARMACY | Facility: CLINIC | Age: 58
End: 2018-11-15
Payer: OTHER GOVERNMENT

## 2018-11-27 PROBLEM — M19.042 ARTHRITIS OF BOTH HANDS: Status: ACTIVE | Noted: 2018-11-27

## 2018-11-27 PROBLEM — M19.041 ARTHRITIS OF BOTH HANDS: Status: ACTIVE | Noted: 2018-11-27

## 2019-06-03 PROBLEM — Z79.899 ON STATIN THERAPY: Status: ACTIVE | Noted: 2019-06-03

## 2019-06-03 PROBLEM — Z79.01 ANTICOAGULATED BY ANTICOAGULATION TREATMENT: Status: ACTIVE | Noted: 2019-06-03

## 2019-06-06 PROBLEM — I25.118 CORONARY ARTERY DISEASE OF NATIVE ARTERY OF NATIVE HEART WITH STABLE ANGINA PECTORIS: Status: ACTIVE | Noted: 2019-06-06

## 2019-10-09 ENCOUNTER — TELEPHONE (OUTPATIENT)
Dept: GASTROENTEROLOGY | Facility: CLINIC | Age: 59
End: 2019-10-09

## 2019-10-09 NOTE — TELEPHONE ENCOUNTER
----- Message from George Magana sent at 10/9/2019  9:57 AM CDT -----  Contact: coreen  Type: Needs Medical Advice    Who Called:  Patient's wife Coreen  Symptoms (please be specific):    How long has patient had these symptoms:   Pharmacy name and phone #:    Best Call Back Number: [phone]  Additional Information: requesting call back to schedule procedure ,patient needs  esophagus dilated

## 2019-10-10 ENCOUNTER — TELEPHONE (OUTPATIENT)
Dept: GASTROENTEROLOGY | Facility: CLINIC | Age: 59
End: 2019-10-10

## 2019-10-10 NOTE — TELEPHONE ENCOUNTER
Pt having EGD 1/15/20 with Dr. Crenshaw. Is pt able to hold Effient for 5 days prior to procedure? Please advise. Thanks

## 2019-10-10 NOTE — TELEPHONE ENCOUNTER
Pt schedule for EGD per pt request. Pt verbalized understanding. confirmation letter and instructions mailed to address on file.

## 2020-01-07 ENCOUNTER — HOSPITAL ENCOUNTER (OUTPATIENT)
Dept: RADIOLOGY | Facility: HOSPITAL | Age: 60
Discharge: HOME | End: 2020-01-07
Attending: INTERNAL MEDICINE
Payer: OTHER GOVERNMENT

## 2020-01-07 ENCOUNTER — OFFICE VISIT (OUTPATIENT)
Dept: RHEUMATOLOGY | Facility: CLINIC | Age: 60
End: 2020-01-07
Payer: OTHER GOVERNMENT

## 2020-01-07 VITALS
WEIGHT: 167.88 LBS | SYSTOLIC BLOOD PRESSURE: 141 MMHG | BODY MASS INDEX: 24.09 KG/M2 | DIASTOLIC BLOOD PRESSURE: 89 MMHG

## 2020-01-07 DIAGNOSIS — Z79.899 ENCOUNTER FOR LONG-TERM (CURRENT) DRUG USE: ICD-10-CM

## 2020-01-07 DIAGNOSIS — M19.90 CHRONIC INFLAMMATORY ARTHRITIS: Primary | ICD-10-CM

## 2020-01-07 DIAGNOSIS — I25.118 CORONARY ARTERY DISEASE OF NATIVE ARTERY OF NATIVE HEART WITH STABLE ANGINA PECTORIS: ICD-10-CM

## 2020-01-07 DIAGNOSIS — E11.9 CONTROLLED TYPE 2 DIABETES MELLITUS WITHOUT COMPLICATION, WITHOUT LONG-TERM CURRENT USE OF INSULIN: ICD-10-CM

## 2020-01-07 DIAGNOSIS — M19.90 CHRONIC INFLAMMATORY ARTHRITIS: ICD-10-CM

## 2020-01-07 PROCEDURE — 73130 X-RAY EXAM OF HAND: CPT | Mod: 50,TC

## 2020-01-07 PROCEDURE — 99204 PR OFFICE/OUTPT VISIT, NEW, LEVL IV, 45-59 MIN: ICD-10-PCS | Mod: S$GLB,,, | Performed by: INTERNAL MEDICINE

## 2020-01-07 PROCEDURE — 99204 OFFICE O/P NEW MOD 45 MIN: CPT | Mod: S$GLB,,, | Performed by: INTERNAL MEDICINE

## 2020-01-07 NOTE — LETTER
January 7, 2020      TONY Roman Jr., MD  [address]  [address]  [place] [address]           [facility] - Rheumatology  [address]  [address]  [place] [address]  Phone: [phone]  Fax: [phone]          Patient: Adair Leigh   MR Number: [id number]   YOB: 1960   Date of Visit: 1/7/2020       Dear Dr. TONY Roman Jr.:    Thank you for referring Adair Leigh to me for evaluation. Attached you will find relevant portions of my assessment and plan of care.    If you have questions, please do not hesitate to call me. I look forward to following Adair Leigh along with you.    Sincerely,    Uche Joyce MD    Enclosure  CC:  No Recipients    If you would like to receive this communication electronically, please contact [email or web address] or [phone] to request more information on [employer] access.    For providers and/or their staff who would like to refer a patient to [facility], please contact us through our one-stop-shop provider referral line, [facility], at [phone].    If you feel you have received this communication in error or would no longer like to receive these types of communications, please e-mail [email or web address]

## 2020-01-07 NOTE — PROGRESS NOTES
[facility] RHEUMATOLOGY           New patient visit    Notes dictated to M*Modal. Please forgive any unintentional errors.  Subjective:       Patient ID:   NAME: Adair Leigh : 1960     59 y.o. male    Referring Doc: TONY Roman Jr., MD  Other Physicians:    Chief Complaint:  Joint Pain      HPI:  This very pleasant gentleman was referred for evaluation of hand pain that has been going on for approximately 6 months.  The patient states that he has been noticing increasing pain in the knuckles of both hands and more recently, swelling in knuckles and finger joints.  It has begun to interfere with his normal activities such as driving his RV.  He has not yet interfered with ADLs.  He relates morning stiffness lasting 30-60 minutes; he has gelling mostly involving the knees.  He has been diagnosed with osteoarthritis in the knees and yesterday received steroid injections from Orthopedics bilaterally.  He notes that this morning he had less swelling and pain in the hands than he has on other days in the last month.  He otherwise takes ibuprofen for joint pain but gets only partial benefit.    His past medical history is positive for hyperlipidemia which sounds like type IIB.  He has a strong family history of hyperlipidemia and coronary artery disease.  He himself had a coronary artery stent at age 33 and 4 years ago underwent single-vessel CABG.  He also has a history of diabetes which has been under generally good control since losing 50 lb over the last 3 years.  He is however on Januvia.    ROS:   GEN:      no fever, night sweats or weight loss  SKIN:     no rashes, erythema, bruising, or swelling, no Raynauds, no photosensitivity  HEENT:  no acute changes in vision, no mouth ulcers, no sicca symptoms, no scalp tenderness, jaw claudication.  CV:        no CP, PND, JIMENEZ or orthopnea, no palpitations  PULM:   no SOB, cough, hemoptysis, sputum or pleuritic pain  GI:          No dysphagia, + GERD,  hematemesis; no abdominal pain, nausea, vomiting, constipation, diarrhea, melanotic stools, BRBPR.  :        no hematuria, dysuria  NEURO: no paresthesias, headaches, visual disturbances  MUSCULOSKELETAL:  Joint pain and swelling involving both hands as above  PSYCH: No insomnia, no anxiety, no depression    Past Medical/Surgical History:  Past Medical History:   Diagnosis Date    Coronary artery disease     Diabetes mellitus     Family history of coronary artery disease 3/30/2015    Hypertension     S/P CABG (coronary artery bypass graft) 4/14/2015     Past Surgical History:   Procedure Laterality Date    APPENDECTOMY      APPENDECTOMY      CARDIAC CATHETERIZATION      CARDIAC SURGERY      stents x 9    COLONOSCOPY  1/29/2007  [facility]    Polyp on the ileocecal valve, hot biopsied.   Adenomatous polyp with low grade dysplasia.  A couple of small polyps cauterized and ablated throughout the transverse and descending colon.  Small polyps in the sigmoid and rectum cauterized and ablated without biopsies.  Mild internal hemorrhoids.      COLONOSCOPY  6/9/2009  [facility]    Non-bleeding internal hemorrhoids.   The entire colon is otherwise normal.      COLONOSCOPY N/A 5/24/2018    Procedure: COLONOSCOPY;  Surgeon: Ricky Crenshaw Jr., MD;  Location: [facility];  Service: Endoscopy;  Laterality: N/A;    COLONOSCOPY W/ POLYPECTOMY  03/04/2013    [facility].   One 2 mm polyp in the distal sigmoid colon.  TUBULAR ADENOMA.  Mild colonic spasm consistent with IBS.  The examination was otherwise normal.      CORONARY ANGIOPLASTY      CORONARY ARTERY BYPASS GRAFT  4/1/2015    LIMA-LAD    ESOPHAGOGASTRODUODENOSCOPY N/A 5/24/2018    Procedure: ESOPHAGOGASTRODUODENOSCOPY (EGD);  Surgeon: Ricky Crenshaw Jr., MD;  Location: [facility];  Service: Endoscopy;  Laterality: N/A;    TONSILLECTOMY      TONSILLECTOMY      UPPER GASTROINTESTINAL ENDOSCOPY  1/29/2007  [facility]    Mild esophageal stenosis with what appeared to be  spasm, biopsied and dilated with a 54 Savory.   The histopathologic changes are compatible with reflux esophagitis.    Gastritis, diffuse with antral erosions, moderate to severe.   Mild active gastritis. No helicobacter seen.    Mild duodenitis.   Mild eosinophilia.  Plasma cells and lymphocytes donot appear increased.  Intraepithelial lymphocytes    UPPER GASTROINTESTINAL ENDOSCOPY  2009  [facility]    Reflux esophagitis.   1-2 mm Antral polyp.   HYPERPLASTIC POLYP.   Normal examined duodenum.  BROOK Test  Negative.    UPPER GASTROINTESTINAL ENDOSCOPY  2014    JAMEL.   Slight non-erosive esophagitis.  Benign stricture, dilated 42 Fr.  (Did not use any larger due to the Effient).  Erythematous mucosa in the prepyloric region.         Allergies:  Review of patient's allergies indicates:   Allergen Reactions    Iodinated contrast media Hives    Plavix [clopidogrel] Hives       Social/Family History:  Social History     Socioeconomic History    Marital status:      Spouse name: Not on file    Number of children: Not on file    Years of education: Not on file    Highest education level: Not on file   Occupational History    Not on file   Social Needs    Financial resource strain: Not on file    Food insecurity:     Worry: Not on file     Inability: Not on file    Transportation needs:     Medical: Not on file     Non-medical: Not on file   Tobacco Use    Smoking status: Former Smoker     Packs/day: 1.00     Years: 15.00     Pack years: 15.00     Last attempt to quit: 1/10/1995     Years since quittin.0    Smokeless tobacco: Never Used   Substance and Sexual Activity    Alcohol use: Yes     Comment: 4/week    Drug use: Never    Sexual activity: Yes     Partners: Female     Birth control/protection: None   Lifestyle    Physical activity:     Days per week: Not on file     Minutes per session: Not on file    Stress: Not at all   Relationships    Social connections:     Talks on phone:  Not on file     Gets together: Not on file     Attends Advent service: Not on file     Active member of club or organization: Not on file     Attends meetings of clubs or organizations: Not on file     Relationship status: Not on file   Other Topics Concern    Not on file   Social History Narrative    Not on file     Family History   Problem Relation Age of Onset    Hypertension Mother     Heart disease Father     Hypertension Father     Heart disease Brother      FAMILY HISTORY: negative for Connective Tissue Disease      Medications:    Current Outpatient Medications:     amlodipine-valsartan (EXFORGE)  mg per tablet, Take 1 tablet by mouth once daily., Disp: 90 tablet, Rfl: 3    atenolol (TENORMIN) 50 MG tablet, Take 1 tablet (50 mg total) by mouth once daily., Disp: 90 tablet, Rfl: 3    atorvastatin (LIPITOR) 40 MG tablet, Take 1 tablet (40 mg total) by mouth once daily., Disp: 90 tablet, Rfl: 3    chromium picolinate 1,000 mcg Tab, Take 1 tablet by mouth., Disp: , Rfl:     ezetimibe (ZETIA) 10 mg tablet, Take 1 tablet (10 mg total) by mouth once daily., Disp: 90 tablet, Rfl: 3    folic acid (FOLVITE) 400 MCG tablet, Take 400 mcg by mouth once daily., Disp: , Rfl:     FREESTYLE LITE STRIPS Strp, USE 1 STRIP TWICE A DAY, Disp: 200 each, Rfl: 3    ibuprofen (ADVIL,MOTRIN) 800 MG tablet, TAKE 1 TABLET EVERY 8 HOURS AS NEEDED FOR PAIN, Disp: 270 tablet, Rfl: 3    nitroGLYCERIN (NITROSTAT) 0.4 MG SL tablet, Place 1 tablet (0.4 mg total) under the tongue every 5 (five) minutes as needed., Disp: 90 tablet, Rfl: 1    omega-3 acid ethyl esters (LOVAZA) 1 gram capsule, TAKE 4 CAPSULES DAILY, Disp: 360 capsule, Rfl: 2    pantoprazole (PROTONIX) 40 MG tablet, Take 1 tablet (40 mg total) by mouth once daily., Disp: 90 tablet, Rfl: 0    prasugrel (EFFIENT) 10 mg Tab, Take 1 tablet (10 mg total) by mouth once daily. (Patient taking differently: Take 10 mg by mouth once daily. 1/2 tablet daily), Disp:  90 tablet, Rfl: 3    selenium 50 mcg Tab, Take 200 mcg by mouth once daily., Disp: , Rfl:     sildenafil (VIAGRA) 100 MG tablet, Take 1 tablet (100 mg total) by mouth once daily., Disp: 90 tablet, Rfl: 3    SITagliptin (JANUVIA) 50 MG Tab, Take 1 tablet (50 mg total) by mouth once daily., Disp: 90 tablet, Rfl: 3    zolpidem (AMBIEN) 10 mg Tab, Take 1 tablet (10 mg total) by mouth nightly as needed., Disp: 30 tablet, Rfl: 0  No current facility-administered medications for this visit.     Objective:     Vitals:  Blood pressure (!) 141/89, weight 76.2 kg (167 lb 14.4 oz).    Physical Examination:   GEN:     wn/wd male in no apparent distress  SKIN:    no rashes, no sclerodactyly, no Raynaud's, no periungual erythema, no digital tip tapering, no nailbed pitting  HEAD:   no alopecia, no scalp tenderness, no temporal artery tenderness or induration.  EYES:   no conjunctival pallor, no icterus  ENT:     no thrush, no mucosal dryness or ulcerations, adequate oral hygiene & dentition.  NECK:  supple x 6, no masses, no thyromegaly, no lymphadenopathy.  CV:        S1 and S2, RRR, no murmurs, gallop or rubs  CHEST: Normal respiratory effort;  normal breath sounds/no adventitious sounds. No signs of consolidation.  ABD:      non-tender and non-distended; soft; normal bowel sounds; no rebound, guarding, or tenderness. No hepatosplenomegaly.  Musculoskeletal:  Moderate synovial proliferation and active synovitis is noted on the 3rd > 2nd right MCP joint, in the left wrist, left 2nd and 3rd MCP joints, and in scattered PIP joints bilaterally.  There is no evidence of large joint inflammation.  There is no evidence of advanced degenerative change in the knees though mild jejunum varus deformity is present along with moderate crepitus.  There is moderate metatarsalgia present bilaterally with 6/10 individually tested MTP joints that are inflamed and tender on both feet.  Muscle strength is 5/5 x4.  Range of motion of the back is  adequate.  Posture is good.  The gait is mildly antalgic favoring the right knee.  EXTREM: no clubbing, cyanosis or edema. normal pulses. Jolly's - x2  NEURO:  grossly intact; motor/sensory WNL; no tremors  PSYCH:  normal mood, affect and behavior    Labs:   Lab Results   Component Value Date    WBC 5.94 01/03/2020    HGB 14.3 01/03/2020    HCT 39.7 (L) 01/03/2020    MCV 96 01/03/2020     01/03/2020   CMP@  Sodium   Date Value Ref Range Status   01/03/2020 140 136 - 145 mmol/L Final     Potassium   Date Value Ref Range Status   01/03/2020 4.0 3.5 - 5.1 mmol/L Final     Chloride   Date Value Ref Range Status   01/03/2020 105 95 - 110 mmol/L Final     CO2   Date Value Ref Range Status   01/03/2020 25 22 - 31 mmol/L Final     Glucose   Date Value Ref Range Status   01/03/2020 102 70 - 110 mg/dL Final     Comment:     The ADA recommends the following guidelines for fasting glucose:  Normal:       less than 100 mg/dL  Prediabetes:  100 mg/dL to 125 mg/dL  Diabetes:     126 mg/dL or higher       BUN, Bld   Date Value Ref Range Status   01/03/2020 13 9 - 21 mg/dL Final     Creatinine   Date Value Ref Range Status   01/03/2020 0.75 0.50 - 1.40 mg/dL Final     Calcium   Date Value Ref Range Status   01/03/2020 9.1 8.4 - 10.2 mg/dL Final     Total Protein   Date Value Ref Range Status   01/03/2020 7.3 6.0 - 8.4 g/dL Final     Albumin   Date Value Ref Range Status   01/03/2020 4.4 3.5 - 5.2 g/dL Final     Total Bilirubin   Date Value Ref Range Status   01/03/2020 0.8 0.2 - 1.3 mg/dL Final     Alkaline Phosphatase   Date Value Ref Range Status   01/03/2020 62 38 - 145 U/L Final     AST   Date Value Ref Range Status   01/03/2020 59 17 - 59 U/L Final     ALT   Date Value Ref Range Status   01/03/2020 71 (H) 10 - 44 U/L Final     CRP   Date Value Ref Range Status   01/07/2020 <0.02 0.00 - 0.75 mg/dL Final     Uric Acid   Date Value Ref Range Status   11/09/2017 6.9 3.4 - 7.0 mg/dL Final       Radiology/Diagnostic  Studies:    None    Assessment/Discussion/Plan:   59 y.o. male with subacute small joint, symmetrical arthritis highly suggestive of new onset rheumatoid arthritis    PLAN:  I have discussed the differential diagnosis with him including the possibility of a spondyloarthropathy.  However, I am relatively certain that this is early RA.  I discussed the treatment of rheumatoid in detail with the patient and his wife.  I explained to him that the control and even remission are the most likely outcomes now.    I also explained the necessity of controlling any inflammatory arthritis aggressively due to the increased risk it poses for his coronary artery disease.    I ordered appropriate blood testing including a CCP/RF, and all acute phase reactants.  An HLA B27 was ordered.  X-rays of both hands were ordered and have already been reviewed. These are not suggestive of erosive disease though soft tissue swelling is quite evident at the level of the right 2nd and 3rd MCP joints.    I have discussed the probability that I will start him on hydroxychloroquine at our next appointment.  I have asked him to see his ophthalmologist to get a baseline retinal exam.  I have provided them with literature on rheumatoid arthritis and on Plaquenil.    RTC:  I will see him back in 4 weeks.            Electronically signed by Uche Joyce MD

## 2020-01-07 NOTE — PATIENT INSTRUCTIONS
Hydroxychloroquine tablets  What is this medicine?  HYDROXYCHLOROQUINE (trey drox ee KLOR oh kwin) is used to treat rheumatoid arthritis and systemic lupus erythematosus. It is also used to treat malaria.  How should I use this medicine?  Take this medicine by mouth with a glass of water. Follow the directions on the prescription label. If this medicine upsets your stomach take it with food or milk. Take your doses at regular intervals. Do not take your medicine more often than directed.  Talk to your pediatrician regarding the use of this medicine in children. Special care may be needed.  What side effects may I notice from receiving this medicine?  Side effects that you should report to your doctor or health care professional as soon as possible:  · allergic reactions like skin rash, itching or hives, swelling of the face, lips, or tongue  · change in vision  · fever, infection  · hearing loss or ringing  · muscle weakness, tremor, or numbness  · redness, blistering, peeling or loosening of the skin, including inside the mouth  · seizures  · unusual bleeding or bruising  · unusually weak or tired  Side effects that usually do not require medical attention (report to your doctor or health care professional if they continue or are bothersome):  · change in coloration of the mouth or skin  · dizziness  · hair loss, lightening  · headache  · irritability, nervousness, nightmares  · loss of appetite  · stomach upset, diarrhea  What may interact with this medicine?  · antacids  · botulinum toxins  · digoxin  · kaolin  · penicillamine  What if I miss a dose?  If you miss a dose, take it as soon as you can. If it is almost time for your next dose, take only that dose. Do not take double or extra doses.  Where should I keep my medicine?  Keep out of the reach of children. In children, this medicine can cause overdose with small doses.  Store at room temperature between 15 and 30 degrees C (59 and 86 degrees F). Protect  from moisture and light. Throw away any unused medicine after the expiration date.  What should I tell my health care provider before I take this medicine?  They need to know if you have any of these conditions:  · alcoholism  · anemia or other blood disorder  · eye disease  · glucose 6-phosphate dehydrogenase (G6PD) deficiency  · liver disease  · porphyria  · psoriasis  · an unusual or allergic reaction to chloroquine, hydroxychloroquine, other medicines, foods, dyes, or preservatives  · pregnant or trying to get pregnant  · breast-feeding  What should I watch for while using this medicine?  Visit your doctor or health care professional for regular check ups. Tell your doctor if your symptoms do not improve. Arthritis symptoms may take several weeks to improve. If you are taking this medicine for a long time, you will need important blood work done. You will also need to have your eyes checked as directed.  This medicine can make you more sensitive to the sun. Keep out of the sun. If you cannot avoid being in the sun, wear protective clothing and use sunscreen. Do not use sun lamps or tanning beds/booths.  Avoid antacids and kaolin containing products for 2 hours before and after taking a dose of this medicine.  NOTE:This sheet is a summary. It may not cover all possible information. If you have questions about this medicine, talk to your doctor, pharmacist, or health care provider. Copyright© 2017 [employer]        Rheumatoid Arthritis  You have rheumatoid arthritis (RA). This is a chronic disease that mainly affects the joints. Sometimes, it also affects other parts of the body. RA is an autoimmune disease. This means that the bodys immune system, which normally protects the body, causes harm instead. With RA, the immune system attacks the joints. The reason for this is unknown.  In most cases, RA affects pairs of joints on both sides of the body. These can include joints in both elbows, wrists, hands, knees,  feet, or ankles. The disease often starts slowly. Early symptoms include stiffness, muscle aches, weakness, and fatigue. Over time, the joints may begin to hurt. They may also become warm, swollen, or tender. Symptoms may feel worse in the morning after a nights rest and may get better with activity.  With RA, you may have periods of active disease (when symptoms worsen) followed by periods of remission (when symptoms improve or go away). There is no known cure for RA. But medical treatment can slow or stop the progress of the disease. It can also help relieve symptoms. For advanced disease, surgery, such as joint replacement, may be the best option.  Home care  · If you were prescribed a medicine, take it as directed.  · To help control swelling and pain, acetaminophen, ibuprofen, or another NSAID (non-steroidal anti-inflammatory drug) may be recommended. Note: If you have chronic liver or kidney disease or ever had a stomach ulcer or gastrointestinal bleeding, tell your healthcare provider before taking any of these medicines.  · Some persons find relief with heat (hot shower, hot bath, or heating pad). Others prefer cold (ice in a plastic bag, wrapped in a towel). Try both. Then use the method you like best. Use heat or cold for about 20 minutes, a few times a day.  · Exercise is a key part of treatment for RA. It helps reduce pain. It may also improve flexibility. Do your best to be active daily. Move your joints through their full range of motion each morning. Avoid staying in any one position for long periods of time. Take breaks throughout the day and move around. Also, ask your healthcare provider or physical therapist what exercises are best for you.  · If you are overweight, lose weight. Extra weight puts stress on your joints.  · If you smoke, quit. Smoking raises the risk of other problems linked to RA.  · No herbal product or nutritional supplement has been proven to help RA. But treatments such as  acupuncture and massage may help relieve pain.  · Talk to your healthcare provider or occupational therapist about easier ways to perform daily tasks. This may include the use of assistive devices. These are special tools that can help with things like dressing, bathing, cooking, driving, and moving or getting around.  Follow-up care  Follow up with your healthcare provider, or as advised.   When to seek medical advice  Call your healthcare provider right away if any of these occur:  · Increasing weakness, pale color of the skin, fainting  · Chest pain or shortness of breath  · Blood in vomit or stool (black or red color)  · Changes in vision  · Skin ulcers  · Fever of 100.4ºF (38ºC) or higher, or as directed by your healthcare provider  · New joint pain  · New rash  Resources  To learn more about RA, contact:  · Arthritis Foundation, 405.820.3580, www.arthritis.org  · National [place] of Arthritis and Musculoskeletal and Skin Diseases (NIAMS), 589.662.9152, www.niams.nih.gov     Date Last Reviewed: 3/1/2017  © 7169-3072 [employer]. [address] [place] [address], [place] [address]. All rights reserved. This information is not intended as a substitute for professional medical care. Always follow your healthcare professional's instructions.

## 2020-01-08 ENCOUNTER — PATIENT MESSAGE (OUTPATIENT)
Dept: GASTROENTEROLOGY | Facility: CLINIC | Age: 60
End: 2020-01-08

## 2020-01-12 PROBLEM — E11.59 HYPERTENSION COMPLICATING DIABETES: Status: ACTIVE | Noted: 2020-01-12

## 2020-01-12 PROBLEM — I15.2 HYPERTENSION COMPLICATING DIABETES: Status: ACTIVE | Noted: 2020-01-12

## 2020-01-15 ENCOUNTER — ANESTHESIA EVENT (OUTPATIENT)
Dept: ENDOSCOPY | Facility: HOSPITAL | Age: 60
End: 2020-01-15
Payer: OTHER GOVERNMENT

## 2020-01-15 ENCOUNTER — HOSPITAL ENCOUNTER (OUTPATIENT)
Facility: HOSPITAL | Age: 60
Discharge: HOME | End: 2020-01-15
Attending: INTERNAL MEDICINE | Admitting: INTERNAL MEDICINE
Payer: OTHER GOVERNMENT

## 2020-01-15 ENCOUNTER — ANESTHESIA (OUTPATIENT)
Dept: ENDOSCOPY | Facility: HOSPITAL | Age: 60
End: 2020-01-15
Payer: OTHER GOVERNMENT

## 2020-01-15 VITALS
HEART RATE: 59 BPM | RESPIRATION RATE: 17 BRPM | DIASTOLIC BLOOD PRESSURE: 71 MMHG | HEIGHT: 70 IN | TEMPERATURE: 98 F | SYSTOLIC BLOOD PRESSURE: 140 MMHG | WEIGHT: 165 LBS | OXYGEN SATURATION: 97 % | BODY MASS INDEX: 23.62 KG/M2

## 2020-01-15 DIAGNOSIS — R13.10 DYSPHAGIA: ICD-10-CM

## 2020-01-15 LAB
GLUCOSE SERPL-MCNC: 96 MG/DL (ref 70–110)
H PYLORI INDEX VALUE: NEGATIVE

## 2020-01-15 PROCEDURE — 00731 ANES UPR GI NDSC PX NOS: CPT | Mod: PO | Performed by: INTERNAL MEDICINE

## 2020-01-15 PROCEDURE — 63600175 PHARM REV CODE 636 W HCPCS: Mod: PO | Performed by: INTERNAL MEDICINE

## 2020-01-15 PROCEDURE — 88305 TISSUE EXAM BY PATHOLOGIST: CPT | Mod: 26,,, | Performed by: PATHOLOGY

## 2020-01-15 PROCEDURE — 43248 EGD GUIDE WIRE INSERTION: CPT | Mod: PO | Performed by: INTERNAL MEDICINE

## 2020-01-15 PROCEDURE — 37000009 HC ANESTHESIA EA ADD 15 MINS: Mod: PO | Performed by: INTERNAL MEDICINE

## 2020-01-15 PROCEDURE — 43239 PR EGD, FLEX, W/BIOPSY, SGL/MULTI: ICD-10-PCS | Mod: 59,,, | Performed by: INTERNAL MEDICINE

## 2020-01-15 PROCEDURE — D9220A PRA ANESTHESIA: ICD-10-PCS | Mod: ANES,,, | Performed by: ANESTHESIOLOGY

## 2020-01-15 PROCEDURE — D9220A PRA ANESTHESIA: Mod: CRNA,,, | Performed by: NURSE ANESTHETIST, CERTIFIED REGISTERED

## 2020-01-15 PROCEDURE — 87449 NOS EACH ORGANISM AG IA: CPT | Mod: PO | Performed by: INTERNAL MEDICINE

## 2020-01-15 PROCEDURE — 88305 TISSUE EXAM BY PATHOLOGIST: ICD-10-PCS | Mod: 26,,, | Performed by: PATHOLOGY

## 2020-01-15 PROCEDURE — 27201012 HC FORCEPS, HOT/COLD, DISP: Mod: PO | Performed by: INTERNAL MEDICINE

## 2020-01-15 PROCEDURE — D9220A PRA ANESTHESIA: Mod: ANES,,, | Performed by: ANESTHESIOLOGY

## 2020-01-15 PROCEDURE — 37000008 HC ANESTHESIA 1ST 15 MINUTES: Mod: PO | Performed by: INTERNAL MEDICINE

## 2020-01-15 PROCEDURE — 43239 EGD BIOPSY SINGLE/MULTIPLE: CPT | Mod: 59,,, | Performed by: INTERNAL MEDICINE

## 2020-01-15 PROCEDURE — 43239 EGD BIOPSY SINGLE/MULTIPLE: CPT | Mod: PO | Performed by: INTERNAL MEDICINE

## 2020-01-15 PROCEDURE — 82962 GLUCOSE BLOOD TEST: CPT | Mod: PO | Performed by: INTERNAL MEDICINE

## 2020-01-15 PROCEDURE — 88305 TISSUE EXAM BY PATHOLOGIST: CPT | Mod: 59 | Performed by: PATHOLOGY

## 2020-01-15 PROCEDURE — 43248 PR EGD, FLEX, W/DILATION OVER GUIDEWIRE: ICD-10-PCS | Mod: ,,, | Performed by: INTERNAL MEDICINE

## 2020-01-15 PROCEDURE — D9220A PRA ANESTHESIA: ICD-10-PCS | Mod: CRNA,,, | Performed by: NURSE ANESTHETIST, CERTIFIED REGISTERED

## 2020-01-15 PROCEDURE — 63600175 PHARM REV CODE 636 W HCPCS: Mod: PO | Performed by: NURSE ANESTHETIST, CERTIFIED REGISTERED

## 2020-01-15 PROCEDURE — 43248 EGD GUIDE WIRE INSERTION: CPT | Mod: ,,, | Performed by: INTERNAL MEDICINE

## 2020-01-15 RX ORDER — LIDOCAINE HCL/PF 100 MG/5ML
SYRINGE (ML) INTRAVENOUS
Status: DISCONTINUED | OUTPATIENT
Start: 2020-01-15 | End: 2020-01-15

## 2020-01-15 RX ORDER — FAMOTIDINE 40 MG/1
40 TABLET, FILM COATED ORAL NIGHTLY
Qty: 60 TABLET | Refills: 5 | Status: SHIPPED | OUTPATIENT
Start: 2020-01-15 | End: 2020-08-18

## 2020-01-15 RX ORDER — SODIUM CHLORIDE 0.9 % (FLUSH) 0.9 %
10 SYRINGE (ML) INJECTION
Status: DISCONTINUED | OUTPATIENT
Start: 2020-01-15 | End: 2020-01-15 | Stop reason: HOSPADM

## 2020-01-15 RX ORDER — PROPOFOL 10 MG/ML
VIAL (ML) INTRAVENOUS
Status: DISCONTINUED | OUTPATIENT
Start: 2020-01-15 | End: 2020-01-15

## 2020-01-15 RX ORDER — SODIUM CHLORIDE, SODIUM LACTATE, POTASSIUM CHLORIDE, CALCIUM CHLORIDE 600; 310; 30; 20 MG/100ML; MG/100ML; MG/100ML; MG/100ML
INJECTION, SOLUTION INTRAVENOUS CONTINUOUS
Status: DISCONTINUED | OUTPATIENT
Start: 2020-01-15 | End: 2020-01-15 | Stop reason: HOSPADM

## 2020-01-15 RX ADMIN — PROPOFOL 50 MG: 10 INJECTION, EMULSION INTRAVENOUS at 08:01

## 2020-01-15 RX ADMIN — LIDOCAINE HYDROCHLORIDE 100 MG: 20 INJECTION, SOLUTION INTRAVENOUS at 08:01

## 2020-01-15 RX ADMIN — PROPOFOL 100 MG: 10 INJECTION, EMULSION INTRAVENOUS at 08:01

## 2020-01-15 RX ADMIN — PROPOFOL 40 MG: 10 INJECTION, EMULSION INTRAVENOUS at 08:01

## 2020-01-15 RX ADMIN — SODIUM CHLORIDE, SODIUM LACTATE, POTASSIUM CHLORIDE, AND CALCIUM CHLORIDE: .6; .31; .03; .02 INJECTION, SOLUTION INTRAVENOUS at 07:01

## 2020-01-15 NOTE — H&P
History & Physical - Short Stay  Gastroenterology      SUBJECTIVE:     Procedure: Gastroscopy    Chief Complaint/Indication for Procedure: Dysphagia.    History of Present Illness:  Office Visit     9/26/2019  [facility]   SERJIO Roca   Internal Medicine   Easy bruising +6 more   Dx   easy bleeding\   , need referrals   ; Referred by Aaareferral Self   Reason for Visit    Progress Notes           Subjective:   Patient ID: Adair Leigh is a 58 y.o. male.     Chief Complaint: easy bleeding\ (labs done ) and need referrals (Dr Nelson and Dr Crenshaw )        HPI:  Pt is here to follow up on easy bruising. Had lab done today. States he noticed a few months back that he would find bleeding under the skin more than normal. He correlated it to the time he went from brand to generic Effient.  About 2 weeks ago Dr. Cade decreased his Effient dose to 5 mg and pt has not noticed any improvement. States she has for the past 6 months been traveling in an RV.  Denies recalling if he had hit himself and it only takes just scratching arm to bring blood to skin surface. CBC and pt/inr, and BMP was checked. Denies having bleeding gums, hematuria, melena or hematichezia.     Pt needs referral for annual eye appt with Ophthalmology Dr. Hoover     Pt needs referral for follow up with GI Dr. Crenshaw for dysphagia and previous dilatation of esophagus. Even water gets stuck.     Assessment:       1. Easy bruising    2. Esophageal dysphagia    3. Vision changes    4. Anemia, unspecified type    5. Controlled type 2 diabetes mellitus without complication, without long-term current use of insulin    6. Coronary artery disease, LIMA to LAD 04/15, and multiple stents    7. Essential hypertension           Plan:       Adair was seen today for easy bleedingand need referrals.     Diagnoses and all orders for this visit:  Lab results were reviewed with patient at the time of the office visit.  These results  were explained in their relationship to patients current visit, list of diagnosis and future treatment plan.  The patients concerns were addressed and questions answered. The pt verbalized understanding and acceptance of the treatment plan and that I have answered questions satisfactorily.     Easy bruising- discussed red flags for pt to follow up, bleeding while brushing teeth, blood in urine or stool or black stool.      Esophageal dysphagia- small frequent meals. Avoid bread and large pieces of meat and small frequent sips of beverage.  -     Ambulatory consult to Gastroenterology     Vision changes  -     Ambulatory Referral to Ophthalmology     Anemia, unspecified type- chronic and stable recommended added B12 to vitamin.      Controlled type 2 diabetes mellitus without complication, without long-term current use of insulin- cont current meds     Coronary artery disease, LIMA to LAD 04/15, and multiple stents- cont with current dose of Effient and follow ups with Dr. Cade.      Essential hypertension- continue current meds.         Follow up for pt has scheduled appt and lab  already.             See last EGD 5/24/2918:  Indications:         Dysphagia, Esophageal reflux, Hx of / Exclusion of                        esophageal stricture. Last EGD zachary kat 1/16/2014  Comorbidities       Coronary artery disease, Hypertension, Status post coronary artery        bypass graft, Status post percutaneous coronary stent placement,        Type 2 diabetes mellitus, Hyperlipidemia, Chronic anticoagulant use        (Effient)  Impression:  - Normal oropharynx.                       - Normal upper third of esophagus and middle third of esophagus.                       - Reflux esophagitis.                       - Z-line regular, 39 cm from the incisors.                       - Benign-appearing esophageal stenosis. Dilated, 51 Fr.                       - Non-erosive esophageal reflux (NERD) disease.                       -  Minimal antritis. Biopsied.                       - Normal stomach otherwise. Biopsied.                       - Normal examined duodenum.  Recommendation:      - Perform a colonoscopy as previously scheduled.                       - Discharge patient to home.                       - Await pathology results.                       - Follow an antireflux regimen.                       - Use Protonix (pantoprazole) 40 mg PO daily.                       - Repeat upper endoscopy PRN for retreatment.  Ricky Crenshaw MD  5/24/2018      PTA Medications   Medication Sig    amlodipine-valsartan (EXFORGE)  mg per tablet Take 1 tablet by mouth once daily.    atenolol (TENORMIN) 50 MG tablet Take 1 tablet (50 mg total) by mouth once daily.    atorvastatin (LIPITOR) 40 MG tablet Take 1 tablet (40 mg total) by mouth once daily.    chromium picolinate 1,000 mcg Tab Take 1 tablet by mouth.    ezetimibe (ZETIA) 10 mg tablet Take 1 tablet (10 mg total) by mouth once daily.    folic acid (FOLVITE) 400 MCG tablet Take 400 mcg by mouth once daily.    ibuprofen (ADVIL,MOTRIN) 800 MG tablet TAKE 1 TABLET EVERY 8 HOURS AS NEEDED FOR PAIN    omega-3 acid ethyl esters (LOVAZA) 1 gram capsule TAKE 4 CAPSULES DAILY    pantoprazole (PROTONIX) 40 MG tablet Take 1 tablet (40 mg total) by mouth once daily.    prasugrel (EFFIENT) 10 mg Tab Take 1 tablet (10 mg total) by mouth once daily. (Patient taking differently: Take 10 mg by mouth once daily. 1/2 tablet daily)    selenium 50 mcg Tab Take 200 mcg by mouth once daily.    SITagliptin (JANUVIA) 50 MG Tab Take 1 tablet (50 mg total) by mouth once daily.    FREESTYLE LITE STRIPS Strp USE 1 STRIP TWICE A DAY    nitroGLYCERIN (NITROSTAT) 0.4 MG SL tablet Place 1 tablet (0.4 mg total) under the tongue every 5 (five) minutes as needed.    sildenafil (VIAGRA) 100 MG tablet Take 1 tablet (100 mg total) by mouth once daily.    zolpidem (AMBIEN) 10 mg Tab Take 1 tablet (10 mg  total) by mouth nightly as needed.       Review of patient's allergies indicates:   Allergen Reactions    Iodinated contrast media Hives    Plavix [clopidogrel] Hives        Past Medical History:   Diagnosis Date    Coronary artery disease     Diabetes mellitus     Family history of coronary artery disease 3/30/2015    Hypertension     S/P CABG (coronary artery bypass graft) 4/14/2015     Past Surgical History:   Procedure Laterality Date    APPENDECTOMY      APPENDECTOMY      CARDIAC CATHETERIZATION      CARDIAC SURGERY      stents x 9    COLONOSCOPY  1/29/2007  [facility]    Polyp on the ileocecal valve, hot biopsied.   Adenomatous polyp with low grade dysplasia.  A couple of small polyps cauterized and ablated throughout the transverse and descending colon.  Small polyps in the sigmoid and rectum cauterized and ablated without biopsies.  Mild internal hemorrhoids.      COLONOSCOPY  6/9/2009  [facility]    Non-bleeding internal hemorrhoids.   The entire colon is otherwise normal.      COLONOSCOPY N/A 5/24/2018    Procedure: COLONOSCOPY;  Surgeon: Ricky Crenshaw Jr., MD;  Location: [facility];  Service: Endoscopy;  Laterality: N/A;    COLONOSCOPY W/ POLYPECTOMY  03/04/2013    [facility].   One 2 mm polyp in the distal sigmoid colon.  TUBULAR ADENOMA.  Mild colonic spasm consistent with IBS.  The examination was otherwise normal.      CORONARY ANGIOPLASTY      CORONARY ARTERY BYPASS GRAFT  4/1/2015    LIMA-LAD    ESOPHAGOGASTRODUODENOSCOPY N/A 5/24/2018    Procedure: ESOPHAGOGASTRODUODENOSCOPY (EGD);  Surgeon: Ricky Crenshaw Jr., MD;  Location: [facility];  Service: Endoscopy;  Laterality: N/A;    TONSILLECTOMY      TONSILLECTOMY      UPPER GASTROINTESTINAL ENDOSCOPY  1/29/2007  [facility]    Mild esophageal stenosis with what appeared to be spasm, biopsied and dilated with a 54 Savory.   The histopathologic changes are compatible with reflux esophagitis.    Gastritis, diffuse with antral erosions, moderate  "to severe.   Mild active gastritis. No helicobacter seen.    Mild duodenitis.   Mild eosinophilia.  Plasma cells and lymphocytes donot appear increased.  Intraepithelial lymphocytes    UPPER GASTROINTESTINAL ENDOSCOPY  2009  [facility]    Reflux esophagitis.   1-2 mm Antral polyp.   HYPERPLASTIC POLYP.   Normal examined duodenum.  BROOK Test  Negative.    UPPER GASTROINTESTINAL ENDOSCOPY  2014    [facility].   Slight non-erosive esophagitis.  Benign stricture, dilated 42 Fr.  (Did not use any larger due to the Effient).  Erythematous mucosa in the prepyloric region.       Family History   Problem Relation Age of Onset    Hypertension Mother     Heart disease Father     Hypertension Father     Heart disease Brother     Cancer Neg Hx      Social History     Tobacco Use    Smoking status: Former Smoker     Packs/day: 1.00     Years: 15.00     Pack years: 15.00     Last attempt to quit: 1/10/1995     Years since quittin.0    Smokeless tobacco: Never Used   Substance Use Topics    Alcohol use: Yes     Frequency: 2-3 times a week     Drinks per session: 1 or 2     Binge frequency: Less than monthly     Comment: 4/week    Drug use: Never         OBJECTIVE:     Vital Signs (Most Recent)  Temp: 98.1 °F (36.7 °C) (01/15/20 0732)  Pulse: (!) 54 (01/15/20 0732)  Resp: 16 (01/15/20 0732)  BP: (!) 143/76 (01/15/20 0732)  SpO2: 98 % (01/15/20 0732)    Physical Exam:  :Ht 5' 10" (1.778 m)   Wt 75.3 kg (165 lb 14.4 oz)  BMI 23.80 kg/m²                                                        GENERAL:  Comfortable, in no acute distress.                                 HEENT EXAM:  Nonicteric.  No adenopathy.  Oropharynx is clear.               NECK:  Supple.                                                               LUNGS:  Clear.                                                               CARDIAC:  Regular rate and rhythm.  S1, S2.  No murmur.                      ABDOMEN:  Soft, positive bowel sounds, nontender.  " No hepatosplenomegaly or masses.  No rebound or guarding.                                             EXTREMITIES:  No edema.     MENTAL STATUS:  Alert and oriented.    ASSESSMENT/PLAN:     Assessment: Dysphagia.    Plan: Gastroscopy    Anesthesia Plan:   MAC / General Anaesthesia    ASA Grade: ASA 2 - Patient with mild systemic disease with no functional limitations    MALLAMPATI SCORE: I (soft palate, uvula, fauces, and tonsillar pillars visible)

## 2020-01-15 NOTE — DISCHARGE INSTRUCTIONS
Recovery After Procedural Sedation (Adult)  You have been given medicine by vein to make you sleep during your surgery. This may have included both a pain medicine and sleeping medicine. Most of the effects have worn off. But you may still have some drowsiness for the next 6 to 8 hours.  Home care  Follow these guidelines when you get home:  · For the next 8 hours, you should be watched by a responsible adult. This person should make sure your condition is not getting worse.  · Don't drink any alcohol for the next 24 hours.  · Don't drive, operate dangerous machinery, or make important business or personal decisions during the next 24 hours.  Note: Your healthcare provider may tell you not to take any medicine by mouth for pain or sleep in the next 4 hours. These medicines may react with the medicines you were given in the hospital. This could cause a much stronger response than usual.  Follow-up care  Follow up with your healthcare provider if you are not alert and back to your usual level of activity within 12 hours.  When to seek medical advice  Call your healthcare provider right away if any of these occur:  · Drowsiness gets worse  · Weakness or dizziness gets worse  · Repeated vomiting  · You can't be awakened   Date Last Reviewed: 10/18/2016  © 3560-0058 [employer]. [address] [place] [address], [place] [address]. All rights reserved. This information is not intended as a substitute for professional medical care. Always follow your healthcare professional's instructions.        Tips to Control Acid Reflux    To control acid reflux, youll need to make some basic diet and lifestyle changes. The simple steps outlined below may be all youll need to ease discomfort.  Watch what you eat  · Avoid fatty foods and spicy foods.  · Eat fewer acidic foods, such as citrus and tomato-based foods. These can increase symptoms.  · Limit drinking alcohol, caffeine, and fizzy beverages. All increase acid  reflux.  · Try limiting chocolate, peppermint, and spearmint. These can worsen acid reflux in some people.  Watch when you eat  · Avoid lying down for 3 hours after eating.  · Do not snack before going to bed.  Raise your head  Raising your head and upper body by 4 to 6 inches helps limit reflux when youre lying down. Put blocks under the head of your bed frame to raise it.  Other changes  · Lose weight, if you need to  · Dont exercise near bedtime  · Avoid tight-fitting clothes  · Limit aspirin and ibuprofen  · Stop smoking   Date Last Reviewed: 7/1/2016  © 5498-6155 [employer]. [address] [place] [address], [place] [address]. All rights reserved. This information is not intended as a substitute for professional medical care. Always follow your healthcare professional's instructions.

## 2020-01-15 NOTE — ANESTHESIA POSTPROCEDURE EVALUATION
Anesthesia Post Evaluation    Patient: Adair Leigh    Procedure(s) Performed: Procedure(s) (LRB):  EGD (ESOPHAGOGASTRODUODENOSCOPY) (N/A)    Final Anesthesia Type: general    Patient location during evaluation: PACU  Patient participation: Yes- Able to Participate  Level of consciousness: awake and alert  Post-procedure vital signs: reviewed and stable  Pain management: adequate  Airway patency: patent    PONV status at discharge: No PONV  Anesthetic complications: no      Cardiovascular status: hemodynamically stable  Respiratory status: unassisted and room air  Hydration status: euvolemic  Follow-up not needed.          Vitals Value Taken Time   /71 1/15/2020  9:00 AM   Temp 36.5 °C (97.7 °F) 1/15/2020  8:30 AM   Pulse 59 1/15/2020  9:00 AM   Resp 17 1/15/2020  9:00 AM   SpO2 97 % 1/15/2020  9:00 AM         No case tracking events are documented in the log.      Pain/Jessica Score: Jessica Score: 10 (1/15/2020  9:00 AM)

## 2020-01-15 NOTE — BRIEF OP NOTE
Discharge Note  Short Stay      SUMMARY     Admit Date: 1/15/2020    Attending Physician: Ricky Crenshaw Jr., MD     Discharge Physician: Ricky Crenshaw Jr., MD    Discharge Date: 1/15/2020 8:45 AM    Final Diagnosis: Dysphagia, unspecified type [R13.10]  Gastroesophageal reflux disease, esophagitis presence not specified [K21.9]  Impression:          - Normal oropharynx.                       - Normal upper third of esophagus and middle third                        of esophagus.                       - Slight reflux esophagitis. Biopsied.                       - Benign-appearing esophageal stenosis. Dilated, 51                        Fr.                       - Z-line regular, 39-40 cm from the incisors.                       - Normal cardia.                       - Normal gastric fundus and gastric body.                       - Gastritis. Biopsied.                       - Normal pylorus.                       - Normal examined duodenum.  Recommendation:      - Discharge patient to home.                       - Await pathology results.                       - Follow an antireflux regimen.                       - Continue present medications.                       - Use Protonix (pantoprazole) 40 mg PO daily.                       - Use Pepcid (famotidine) 40 mg PO nightly for 2                        months.                       - Call the G.I. clinic in 2 weeks for reports (if                        you haven't heard from us sooner) [phone].                       - Repeat upper endoscopy PRN for retreatment.  Ricky Crenshaw MD  1/15/2020  Disposition: HOME OR SELF CARE    Patient Instructions:   Current Discharge Medication List      START taking these medications    Details   famotidine (PEPCID) 40 MG tablet Take 1 tablet (40 mg total) by mouth every evening. About an hour before bedtime.  Qty: 60 tablet, Refills: 5         CONTINUE these medications which have NOT CHANGED    Details    amlodipine-valsartan (EXFORGE)  mg per tablet Take 1 tablet by mouth once daily.  Qty: 90 tablet, Refills: 3      atenolol (TENORMIN) 50 MG tablet Take 1 tablet (50 mg total) by mouth once daily.  Qty: 90 tablet, Refills: 3      atorvastatin (LIPITOR) 40 MG tablet Take 1 tablet (40 mg total) by mouth once daily.  Qty: 90 tablet, Refills: 3      chromium picolinate 1,000 mcg Tab Take 1 tablet by mouth.      ezetimibe (ZETIA) 10 mg tablet Take 1 tablet (10 mg total) by mouth once daily.  Qty: 90 tablet, Refills: 3      folic acid (FOLVITE) 400 MCG tablet Take 400 mcg by mouth once daily.      ibuprofen (ADVIL,MOTRIN) 800 MG tablet TAKE 1 TABLET EVERY 8 HOURS AS NEEDED FOR PAIN  Qty: 270 tablet, Refills: 3      omega-3 acid ethyl esters (LOVAZA) 1 gram capsule TAKE 4 CAPSULES DAILY  Qty: 360 capsule, Refills: 2      pantoprazole (PROTONIX) 40 MG tablet Take 1 tablet (40 mg total) by mouth once daily.  Qty: 90 tablet, Refills: 0      prasugrel (EFFIENT) 10 mg Tab Take 1 tablet (10 mg total) by mouth once daily.  Qty: 90 tablet, Refills: 3      selenium 50 mcg Tab Take 200 mcg by mouth once daily.      SITagliptin (JANUVIA) 50 MG Tab Take 1 tablet (50 mg total) by mouth once daily.  Qty: 90 tablet, Refills: 3      FREESTYLE LITE STRIPS Strp USE 1 STRIP TWICE A DAY  Qty: 200 each, Refills: 3      nitroGLYCERIN (NITROSTAT) 0.4 MG SL tablet Place 1 tablet (0.4 mg total) under the tongue every 5 (five) minutes as needed.  Qty: 90 tablet, Refills: 1      sildenafil (VIAGRA) 100 MG tablet Take 1 tablet (100 mg total) by mouth once daily.  Qty: 90 tablet, Refills: 3      zolpidem (AMBIEN) 10 mg Tab Take 1 tablet (10 mg total) by mouth nightly as needed.  Qty: 30 tablet, Refills: 0             Discharge Procedure Orders (must include Diet, Follow-up, Activity)    Follow Up:  Follow up with PCP as per your routine.  Please follow an anti reflux diet and a high fiber diet.  Activity as tolerated.    No driving day of  procedure.

## 2020-01-15 NOTE — [employer] PATIENT INSTRUCTIONS
Discharge Summary/Instructions after an Endoscopic Procedure  Patient Name: Adair Leigh  Patient MRN: [id number]  Patient YOB: 1960  Wednesday, January 15, 2020  Ricky Crenshaw MD  RESTRICTIONS:  During your procedure today, you received medications for sedation.  These   medications may affect your judgment, balance and coordination.  Therefore,   for 24 hours, you have the following restrictions:   - DO NOT drive a car, operate machinery, make legal/financial decisions,   sign important papers or drink alcohol.    ACTIVITY:  Today: no heavy lifting, straining or running due to procedural   sedation/anesthesia.  The following day: return to full activity including work.  DIET:  Eat and drink normally unless instructed otherwise.     TREATMENT FOR COMMON SIDE EFFECTS:  - Mild abdominal pain, nausea, belching, bloating or excessive gas:  rest,   eat lightly and use a heating pad.  - Sore Throat: treat with throat lozenges and/or gargle with warm salt   water.  - Because air was used during the procedure, expelling large amounts of air   from your rectum or belching is normal.  - If a bowel prep was taken, you may not have a bowel movement for 1-3 days.    This is normal.  SYMPTOMS TO WATCH FOR AND REPORT TO YOUR PHYSICIAN:  1. Abdominal pain or bloating, other than gas cramps.  2. Chest pain.  3. Back pain.  4. Signs of infection such as: chills or fever occurring within 24 hours   after the procedure.  5. Rectal bleeding, which would show as bright red, maroon, or black stools.   (A tablespoon of blood from the rectum is not serious, especially if   hemorrhoids are present.)  6. Vomiting.  7. Weakness or dizziness.  GO DIRECTLY TO THE NEAREST EMERGENCY ROOM IF YOU HAVE ANY OF THE FOLLOWING:      Difficulty breathing              Chills and/or fever over 101 F   Persistent vomiting and/or vomiting blood   Severe abdominal pain   Severe chest pain   Black, tarry stools   Bleeding- more than one  tablespoon   Any other symptom or condition that you feel may need urgent attention  Your doctor recommends these additional instructions:  If any biopsies were taken, your doctors clinic will contact you in 1 to 2   weeks with any results.  Follow an antireflux regimen.  This includes:       - Do not lie down for at least 3 to 4 hours after meals.        - Raise the head of the bed 4 to 6 inches.        - Decrease excess weight.        - Avoid citrus juices and other acidic foods, alcohol, chocolate, mints,   coffee and other caffeinated beverages, carbonated beverages, fatty and   fried foods.        - Avoid tight-fitting clothing.        - Avoid cigarettes and other tobacco products.   Continue your present medications.   Take Protonix (pantoprazole) 40 mg by mouth once a day.   Add Pepcid (famotidine) 40 mg by mouth once at night for two months.   Your physician has recommended a repeat upper endoscopy with dilation as   needed for retreatment.  For questions, problems or results please call your physician - Ricky Crenshaw MD at Work:  [phone].  EMERGENCY PHONE NUMBER: [phone], LAB RESULTS: [phone]  IF A COMPLICATION OR EMERGENCY SITUATION ARISES AND YOU ARE UNABLE TO REACH   YOUR PHYSICIAN - GO DIRECTLY TO THE EMERGENCY ROOM.  ___________________________________________  Nurse Signature  ___________________________________________  Patient/Designated Responsible Party Signature  Ricky Crenshaw MD  1/15/2020 8:42:32 AM  This report has been verified and signed electronically.  [employer]

## 2020-01-15 NOTE — ANESTHESIA PREPROCEDURE EVALUATION
01/15/2020  Adair Leigh is a 59 y.o., male.    Pre-op Assessment    I have reviewed the Patient Summary Reports.     I have reviewed the Nursing Notes.   I have reviewed the Medications.     Review of Systems  Anesthesia Hx:  No problems with previous Anesthesia    Social:  No Alcohol Use, Former Smoker    Cardiovascular:   Hypertension CAD  CABG/stent (CABG 2015, stents 2015)  Angina    Pulmonary:  Pulmonary Normal    Renal/:  Renal/ Normal     Hepatic/GI:   GERD Liver Disease,    Neurological:  Neurology Normal    Endocrine:   Diabetes        Physical Exam  General:  Well nourished    Airway/Jaw/Neck:  Airway Findings: Mouth Opening: Normal General Airway Assessment: Adult  Mallampati: II  Jaw/Neck Findings:  Micrognathia  Neck ROM: Extension Decreased, Mild       Chest/Lungs:  Chest/Lungs Findings: Clear to auscultation, Normal Respiratory Rate     Heart/Vascular:  Heart Findings: Rate: Normal  Rhythm: Regular Rhythm  Sounds: Normal  Vascular Findings: No carotid bruits.        Mental Status:  Mental Status Findings:  Cooperative, Alert and Oriented         Anesthesia Plan  Type of Anesthesia, risks & benefits discussed:  Anesthesia Type:  general  Patient's Preference:   Intra-op Monitoring Plan: standard ASA monitors  Intra-op Monitoring Plan Comments:   Post Op Pain Control Plan:   Post Op Pain Control Plan Comments:   Induction:   IV  Beta Blocker:  Patient is on a Beta-Blocker and has received one dose within the past 24 hours (No further documentation required).       Informed Consent: Patient understands risks and agrees with Anesthesia plan.  Questions answered. Anesthesia consent signed with patient.  ASA Score: 3     Day of Surgery Review of History & Physical: I have interviewed and examined the patient. I have reviewed the patient's H&P dated:    H&P update referred to the provider.      Anesthesia Plan Notes: Propofol general.        Ready For Surgery From Anesthesia Perspective.

## 2020-01-15 NOTE — TRANSFER OF CARE
"Anesthesia Transfer of Care Note    Patient: Adair Leigh    Procedure(s) Performed: Procedure(s) (LRB):  EGD (ESOPHAGOGASTRODUODENOSCOPY) (N/A)    Patient location: PACU    Anesthesia Type: general    Transport from OR: Transported from OR on room air with adequate spontaneous ventilation    Post pain: adequate analgesia    Post assessment: no apparent anesthetic complications and tolerated procedure well    Post vital signs: stable    Level of consciousness: sedated    Nausea/Vomiting: no nausea/vomiting    Complications: none    Transfer of care protocol was followed      Last vitals:   Visit Vitals  BP (!) 143/76 (BP Location: Right arm, Patient Position: Sitting)   Pulse (!) 54   Temp 36.7 °C (98.1 °F) (Skin)   Resp 16   Ht 5' 10" (1.778 m)   Wt 74.8 kg (165 lb)   SpO2 98%   BMI 23.68 kg/m²     "

## 2020-01-19 PROBLEM — K29.70 GASTRITIS: Status: ACTIVE | Noted: 2020-01-19

## 2020-01-19 PROBLEM — K20.90 ESOPHAGITIS: Status: ACTIVE | Noted: 2020-01-19

## 2020-01-19 PROBLEM — K22.2 ESOPHAGEAL STENOSIS: Status: ACTIVE | Noted: 2020-01-19

## 2020-01-20 PROBLEM — R80.9 PROTEINURIA: Status: ACTIVE | Noted: 2020-01-20

## 2020-01-24 LAB
FINAL PATHOLOGIC DIAGNOSIS: NORMAL
GROSS: NORMAL

## 2020-02-06 ENCOUNTER — OFFICE VISIT (OUTPATIENT)
Dept: RHEUMATOLOGY | Facility: CLINIC | Age: 60
End: 2020-02-06
Payer: OTHER GOVERNMENT

## 2020-02-06 VITALS
WEIGHT: 161.88 LBS | TEMPERATURE: 98 F | DIASTOLIC BLOOD PRESSURE: 78 MMHG | BODY MASS INDEX: 23.23 KG/M2 | SYSTOLIC BLOOD PRESSURE: 130 MMHG

## 2020-02-06 DIAGNOSIS — M19.90 OSTEOARTHRITIS, UNSPECIFIED OSTEOARTHRITIS TYPE, UNSPECIFIED SITE: ICD-10-CM

## 2020-02-06 DIAGNOSIS — Z79.899 ENCOUNTER FOR LONG-TERM (CURRENT) DRUG USE: Primary | ICD-10-CM

## 2020-02-06 PROCEDURE — 99213 PR OFFICE/OUTPT VISIT, EST, LEVL III, 20-29 MIN: ICD-10-PCS | Mod: S$GLB,,, | Performed by: INTERNAL MEDICINE

## 2020-02-06 PROCEDURE — 99213 OFFICE O/P EST LOW 20 MIN: CPT | Mod: S$GLB,,, | Performed by: INTERNAL MEDICINE

## 2020-02-06 RX ORDER — CELECOXIB 200 MG/1
200 CAPSULE ORAL DAILY
Qty: 90 CAPSULE | Refills: 1 | Status: SHIPPED | OUTPATIENT
Start: 2020-02-06 | End: 2020-07-06

## 2020-02-06 NOTE — PROGRESS NOTES
[facility] RHEUMATOLOGY           Follow-up visit    Notes dictated to M*Modal. Please forgive any unintended errors.  Subjective:       Patient ID:   NAME: Adair Leigh : 1960     59 y.o. male    Referring Doc: TONY Roman Jr., MD  Other Physicians:    Chief Complaint:  Osteoarthritis      HPI/Interval History:  The patient is doing well.  He has been undergoing hyaluronic acid injections into both knees with excellent relief.  He did have bilateral steroid injections about 6 weeks ago.  He noted that his previous complaints of stiffness in his hands resolved almost immediately.  At this time, he has no red, hot, and/or swollen joints.  He has no morning stiffness    ROS:   GEN:    no fever, night sweats or weight loss  SKIN:   no rashes, erythema, bruising, or swelling, no Raynauds, no photosensitivity  HEENT: no changes in vision, no mouth ulcers, no sicca symptoms, no scalp tenderness, no jaw claudication.  CV:      no CP, PND, JIMENEZ, orthopnea, no palpitations  PULM: no SOB, cough, hemoptysis, sputum or pleuritic pain  GI:       no GERD, no dysphagia, no hematemesis, no abdominal pain, nausea, vomiting, constipation, diarrhea, melanotic stools, BRBPR  :      no hematuria, dysuria  NEURO: no paresthesias, headaches, acute visual disturbances  MUSCULOSKELETAL:  No muscle or joint complaints  PSYCH:   No increased insomnia, no increased anxiety, no increased depression    Past Medical/Surgical History:  Past Medical History:   Diagnosis Date    Coronary artery disease     Diabetes mellitus     Family history of coronary artery disease 3/30/2015    Hypertension     S/P CABG (coronary artery bypass graft) 2015     Past Surgical History:   Procedure Laterality Date    APPENDECTOMY      APPENDECTOMY      CARDIAC CATHETERIZATION      CARDIAC SURGERY      stents x 9    COLONOSCOPY  2007  Rafa    Polyp on the ileocecal valve, hot biopsied.   Adenomatous polyp with low grade  dysplasia.  A couple of small polyps cauterized and ablated throughout the transverse and descending colon.  Small polyps in the sigmoid and rectum cauterized and ablated without biopsies.  Mild internal hemorrhoids.      COLONOSCOPY  6/9/2009  Jamel    Non-bleeding internal hemorrhoids.   The entire colon is otherwise normal.      COLONOSCOPY N/A 5/24/2018    Procedure: COLONOSCOPY;  Surgeon: Ricky Crenshaw Jr., MD;  Location: [facility];  Service: Endoscopy;  Laterality: N/A;    COLONOSCOPY W/ POLYPECTOMY  03/04/2013    JAMEL.   One 2 mm polyp in the distal sigmoid colon.  TUBULAR ADENOMA.  Mild colonic spasm consistent with IBS.  The examination was otherwise normal.      CORONARY ANGIOPLASTY      CORONARY ARTERY BYPASS GRAFT  4/1/2015    LIMA-LAD    ESOPHAGOGASTRODUODENOSCOPY N/A 5/24/2018    Procedure: ESOPHAGOGASTRODUODENOSCOPY (EGD);  Surgeon: Ricky Crenshaw Jr., MD;  Location: [facility];  Service: Endoscopy;  Laterality: N/A;    ESOPHAGOGASTRODUODENOSCOPY N/A 1/15/2020    Procedure: EGD (ESOPHAGOGASTRODUODENOSCOPY);  Surgeon: Ricky Crenshaw Jr., MD;  Location: [facility];  Service: Endoscopy;  Laterality: N/A;    TONSILLECTOMY      TONSILLECTOMY      UPPER GASTROINTESTINAL ENDOSCOPY  1/29/2007  Gensler    Mild esophageal stenosis with what appeared to be spasm, biopsied and dilated with a 54 Savory.   The histopathologic changes are compatible with reflux esophagitis.    Gastritis, diffuse with antral erosions, moderate to severe.   Mild active gastritis. No helicobacter seen.    Mild duodenitis.   Mild eosinophilia.  Plasma cells and lymphocytes donot appear increased.  Intraepithelial lymphocytes    UPPER GASTROINTESTINAL ENDOSCOPY  6/9/2009  Jamel    Reflux esophagitis.   1-2 mm Antral polyp.   HYPERPLASTIC POLYP.   Normal examined duodenum.  BROOK Test  Negative.    UPPER GASTROINTESTINAL ENDOSCOPY  01/16/2014    JAMEL.   Slight non-erosive esophagitis.  Benign stricture, dilated 42 Fr.   (Did not use any larger due to the Effient).  Erythematous mucosa in the prepyloric region.         Allergies:  Review of patient's allergies indicates:   Allergen Reactions    Iodinated contrast media Hives    Plavix [clopidogrel] Hives       Social/Family History:  Social History     Socioeconomic History    Marital status:      Spouse name: Not on file    Number of children: Not on file    Years of education: Not on file    Highest education level: Not on file   Occupational History    Not on file   Social Needs    Financial resource strain: Not hard at all    Food insecurity:     Worry: Never true     Inability: Never true    Transportation needs:     Medical: No     Non-medical: No   Tobacco Use    Smoking status: Former Smoker     Packs/day: 1.00     Years: 15.00     Pack years: 15.00     Last attempt to quit: 1/10/1995     Years since quittin.0    Smokeless tobacco: Never Used   Substance and Sexual Activity    Alcohol use: Yes     Frequency: 2-3 times a week     Drinks per session: 1 or 2     Binge frequency: Less than monthly     Comment: 4/week    Drug use: Never    Sexual activity: Yes     Partners: Female     Birth control/protection: None   Lifestyle    Physical activity:     Days per week: 5 days     Minutes per session: 60 min    Stress: Not at all   Relationships    Social connections:     Talks on phone: More than three times a week     Gets together: More than three times a week     Attends Voodoo service: Not on file     Active member of club or organization: Yes     Attends meetings of clubs or organizations: 1 to 4 times per year     Relationship status:    Other Topics Concern    Not on file   Social History Narrative    Not on file     Family History   Problem Relation Age of Onset    Hypertension Mother     Heart disease Father     Hypertension Father     Heart disease Brother     Cancer Neg Hx      FAMILY HISTORY: negative for Connective Tissue  Disease      Medications:    Current Outpatient Medications:     amlodipine-valsartan (EXFORGE)  mg per tablet, Take 1 tablet by mouth once daily., Disp: 90 tablet, Rfl: 3    atenolol (TENORMIN) 50 MG tablet, Take 1 tablet (50 mg total) by mouth once daily., Disp: 90 tablet, Rfl: 3    atorvastatin (LIPITOR) 40 MG tablet, Take 1 tablet (40 mg total) by mouth once daily., Disp: 90 tablet, Rfl: 3    chromium picolinate 1,000 mcg Tab, Take 1 tablet by mouth., Disp: , Rfl:     ezetimibe (ZETIA) 10 mg tablet, Take 1 tablet (10 mg total) by mouth once daily., Disp: 90 tablet, Rfl: 3    famotidine (PEPCID) 40 MG tablet, Take 1 tablet (40 mg total) by mouth every evening. About an hour before bedtime., Disp: 60 tablet, Rfl: 5    folic acid (FOLVITE) 400 MCG tablet, Take 400 mcg by mouth once daily., Disp: , Rfl:     FREESTYLE LITE STRIPS Strp, USE 1 STRIP TWICE A DAY, Disp: 200 each, Rfl: 3    nitroGLYCERIN (NITROSTAT) 0.4 MG SL tablet, Place 1 tablet (0.4 mg total) under the tongue every 5 (five) minutes as needed., Disp: 90 tablet, Rfl: 1    omega-3 acid ethyl esters (LOVAZA) 1 gram capsule, TAKE 4 CAPSULES DAILY, Disp: 360 capsule, Rfl: 2    pantoprazole (PROTONIX) 40 MG tablet, Take 1 tablet (40 mg total) by mouth once daily., Disp: 90 tablet, Rfl: 0    prasugrel (EFFIENT) 10 mg Tab, Take 1 tablet (10 mg total) by mouth once daily. (Patient taking differently: Take 10 mg by mouth once daily. 1/2 tablet daily), Disp: 90 tablet, Rfl: 3    selenium 50 mcg Tab, Take 200 mcg by mouth once daily., Disp: , Rfl:     sildenafil (VIAGRA) 100 MG tablet, Take 1 tablet (100 mg total) by mouth once daily., Disp: 90 tablet, Rfl: 3    SITagliptin (JANUVIA) 50 MG Tab, Take 1 tablet (50 mg total) by mouth once daily., Disp: 90 tablet, Rfl: 3    zolpidem (AMBIEN) 10 mg Tab, Take 1 tablet (10 mg total) by mouth nightly as needed., Disp: 30 tablet, Rfl: 0    celecoxib (CELEBREX) 200 MG capsule, Take 1 capsule (200 mg  total) by mouth once daily., Disp: 90 capsule, Rfl: 1      Objective:     Vitals:  Blood pressure 130/78, temperature 98.4 °F (36.9 °C), weight 73.4 kg (161 lb 14.4 oz).    Physical Examination:   GEN: wn/wd male in no apparent distress  SKIN: no rashes, no sclerodactyly, no Raynaud's, no periungual erythema, no digital tip ulcerations, no nailbed pitting  HEAD: no alopecia, no scalp tenderness, no temporal artery tenderness or induration.  EYES: no pallor, no icterus, PERRLA  ENT:  no thrush, no mucosal dryness or ulcerations, adequate oral hygiene & dentition.  NECK: supple x 6, no masses, no thyromegaly, no lymphadenopathy.  CV:   S1 and S2 regular, no murmurs, gallop or rubs  CHEST: Normal respiratory effort;  normal breath sounds/no adventitious sounds. No signs of consolidation.  ABD: non-tender and non-distended; soft; normal bowel sounds; no rebound/guarding or tenderness. No hepatosplenomegaly.  Musculoskeletal:  No evidence of active synovitis.  No progressive deformity  EXTREM: no clubbing, cyanosis or edema. normal pulses.  NEURO:  grossly intact; motor/sensory WNL; no tremors  PSYCH:  normal mood, affect and behavior    Labs:   Lab Results   Component Value Date    WBC 5.94 01/03/2020    HGB 14.3 01/03/2020    HCT 39.7 (L) 01/03/2020    MCV 96 01/03/2020     01/03/2020   CMP@  Sodium   Date Value Ref Range Status   01/03/2020 140 136 - 145 mmol/L Final     Potassium   Date Value Ref Range Status   01/03/2020 4.0 3.5 - 5.1 mmol/L Final     Chloride   Date Value Ref Range Status   01/03/2020 105 95 - 110 mmol/L Final     CO2   Date Value Ref Range Status   01/03/2020 25 22 - 31 mmol/L Final     Glucose   Date Value Ref Range Status   01/03/2020 102 70 - 110 mg/dL Final     Comment:     The ADA recommends the following guidelines for fasting glucose:  Normal:       less than 100 mg/dL  Prediabetes:  100 mg/dL to 125 mg/dL  Diabetes:     126 mg/dL or higher       BUN, Bld   Date Value Ref Range  Status   01/03/2020 13 9 - 21 mg/dL Final     Creatinine   Date Value Ref Range Status   01/03/2020 0.75 0.50 - 1.40 mg/dL Final     Calcium   Date Value Ref Range Status   01/03/2020 9.1 8.4 - 10.2 mg/dL Final     Total Protein   Date Value Ref Range Status   01/07/2020 7.5 6.0 - 8.5 g/dL Final     Albumin   Date Value Ref Range Status   01/03/2020 4.4 3.5 - 5.2 g/dL Final     Total Bilirubin   Date Value Ref Range Status   01/03/2020 0.8 0.2 - 1.3 mg/dL Final     Alkaline Phosphatase   Date Value Ref Range Status   01/03/2020 62 38 - 145 U/L Final     AST   Date Value Ref Range Status   01/03/2020 59 17 - 59 U/L Final     ALT   Date Value Ref Range Status   01/03/2020 71 (H) 10 - 44 U/L Final     CRP   Date Value Ref Range Status   01/07/2020 <0.02 0.00 - 0.75 mg/dL Final     Rheumatoid Factor   Date Value Ref Range Status   01/07/2020 <10.0 0.0 - 13.9 IU/mL Final     Comment:     Performed at:  [facility] [address]  [id number]  : Augusto Box MD, Phone:  [phone]       Uric Acid   Date Value Ref Range Status   11/09/2017 6.9 3.4 - 7.0 mg/dL Final       Radiology/Diagnostic Studies:    None    Assessment/Discussion/Plan:   59 y.o. male with arthritis, likely early inflammatory disease, presently quiescent    PLAN:  I reviewed the x-rays and blood tests with him.  They are all within normal limits.  As he is feeling well, there is no need to consider treatment at this time.  I have however given him a prescription for Celebrex 200 mg once daily to be taken with his largest meal.  I have reiterated to him and his wife that we may indeed have to start therapy with hydroxychloroquine as discussed at the previous visit though I am inclined to take a wait and see approach.  They are in agreement.    RTC:  I will see him back on an as needed basis    Electronically signed by Uche Joyce MD

## 2020-02-06 NOTE — LETTER
February 6, 2020      TONY Roman Jr., MD  [address]  [address]  [place] [address]           [facility] - Rheumatology  [address]  [address]  [place] [address]  Phone: [phone]  Fax: [phone]          Patient: Adair Leigh   MR Number: [id number]   YOB: 1960   Date of Visit: 2/6/2020       Dear Dr. TONY Roman Jr.:    Thank you for referring Adair Leigh to me for evaluation. Attached you will find relevant portions of my assessment and plan of care.    If you have questions, please do not hesitate to call me. I look forward to following Adair Leigh along with you.    Sincerely,    Uche Joyce MD    Enclosure  CC:  No Recipients    If you would like to receive this communication electronically, please contact [email or web address] or [phone] to request more information on [employer] access.    For providers and/or their staff who would like to refer a patient to [facility], please contact us through our one-stop-shop provider referral line, [facility], at [phone].    If you feel you have received this communication in error or would no longer like to receive these types of communications, please e-mail [email or web address]

## 2020-06-23 ENCOUNTER — HOSPITAL ENCOUNTER (EMERGENCY)
Facility: HOSPITAL | Age: 60
Discharge: HOME | End: 2020-06-23
Attending: FAMILY MEDICINE
Payer: OTHER GOVERNMENT

## 2020-06-23 ENCOUNTER — APPOINTMENT (OUTPATIENT)
Dept: RADIOLOGY | Facility: HOSPITAL | Age: 60
End: 2020-06-23
Payer: OTHER GOVERNMENT

## 2020-06-23 VITALS
DIASTOLIC BLOOD PRESSURE: 75 MMHG | OXYGEN SATURATION: 93 % | BODY MASS INDEX: 22.9 KG/M2 | WEIGHT: 160 LBS | TEMPERATURE: 98 F | HEART RATE: 53 BPM | SYSTOLIC BLOOD PRESSURE: 119 MMHG | RESPIRATION RATE: 16 BRPM | HEIGHT: 70 IN

## 2020-06-23 DIAGNOSIS — I48.91 ATRIAL FIBRILLATION (CMS/HCC): Primary | ICD-10-CM

## 2020-06-23 LAB
ALBUMIN SERPL-MCNC: 4.9 G/DL (ref 3.5–5.3)
ALP SERPL-CCNC: 76 U/L (ref 45–115)
ALT SERPL-CCNC: 44 U/L (ref 7–52)
ANION GAP SERPL CALC-SCNC: 11 MMOL/L (ref 3–11)
AST SERPL-CCNC: 35 U/L
BASOPHILS # BLD AUTO: 0.1 10*3/UL
BASOPHILS NFR BLD AUTO: 1 % (ref 0–2)
BILIRUB SERPL-MCNC: 0.36 MG/DL (ref 0.2–1.4)
BNP SERPL-MCNC: 59 PG/ML (ref 0–100)
BUN SERPL-MCNC: 11 MG/DL (ref 7–25)
CALCIUM ALBUM COR SERPL-MCNC: 9.2 MG/DL (ref 8.6–10.3)
CALCIUM SERPL-MCNC: 9.9 MG/DL (ref 8.6–10.3)
CHLORIDE SERPL-SCNC: 106 MMOL/L (ref 98–107)
CO2 SERPL-SCNC: 23 MMOL/L (ref 21–32)
CREAT SERPL-MCNC: 0.79 MG/DL (ref 0.7–1.3)
DELTA HIGH SENSITIVITY TROPONIN I, 1 HOUR: 0.2
EOSINOPHIL # BLD AUTO: 0.2 10*3/UL
EOSINOPHIL NFR BLD AUTO: 2 % (ref 0–3)
ERYTHROCYTE [DISTWIDTH] IN BLOOD BY AUTOMATED COUNT: 12.6 % (ref 11.5–15)
GFR SERPL CREATININE-BSD FRML MDRD: 97 ML/MIN/1.73M*2
GLUCOSE SERPL-MCNC: 123 MG/DL (ref 70–105)
HCT VFR BLD AUTO: 43.5 % (ref 38–50)
HGB BLD-MCNC: 15 G/DL (ref 13.2–17.2)
INR BLD: 0.9
LYMPHOCYTES # BLD AUTO: 3.6 10*3/UL
LYMPHOCYTES NFR BLD AUTO: 47 % (ref 15–47)
MAGNESIUM SERPL-MCNC: 1.6 MG/DL (ref 1.8–2.4)
MCH RBC QN AUTO: 34.4 PG (ref 29–34)
MCHC RBC AUTO-ENTMCNC: 34.5 G/DL (ref 32–36)
MCV RBC AUTO: 99.5 FL (ref 82–97)
MONOCYTES # BLD AUTO: 0.6 10*3/UL
MONOCYTES NFR BLD AUTO: 8 % (ref 5–13)
NEUTROPHILS # BLD AUTO: 3.3 10*3/UL
NEUTROPHILS NFR BLD AUTO: 42 % (ref 46–70)
PLATELET # BLD AUTO: 248 10*3/UL (ref 130–350)
PMV BLD AUTO: 6.9 FL (ref 6.9–10.8)
POTASSIUM SERPL-SCNC: 3.2 MMOL/L (ref 3.5–5.1)
PROT SERPL-MCNC: 8 G/DL (ref 6–8.3)
PROTHROMBIN TIME: 10.2 SECONDS (ref 9.4–12.5)
RBC # BLD AUTO: 4.37 10*6/ΜL (ref 4.1–5.8)
SODIUM SERPL-SCNC: 140 MMOL/L (ref 135–145)
TROPONIN I SERPL-MCNC: 3.8 PG/ML
TROPONIN I SERPL-MCNC: 4 PG/ML
WBC # BLD AUTO: 7.7 10*3/UL (ref 3.7–9.6)

## 2020-06-23 PROCEDURE — 36415 COLL VENOUS BLD VENIPUNCTURE: CPT | Performed by: FAMILY MEDICINE

## 2020-06-23 PROCEDURE — 99152 MOD SED SAME PHYS/QHP 5/>YRS: CPT

## 2020-06-23 PROCEDURE — 99284 EMERGENCY DEPT VISIT MOD MDM: CPT | Performed by: FAMILY MEDICINE

## 2020-06-23 PROCEDURE — 83735 ASSAY OF MAGNESIUM: CPT | Performed by: FAMILY MEDICINE

## 2020-06-23 PROCEDURE — 96365 THER/PROPH/DIAG IV INF INIT: CPT

## 2020-06-23 PROCEDURE — 83880 ASSAY OF NATRIURETIC PEPTIDE: CPT | Performed by: FAMILY MEDICINE

## 2020-06-23 PROCEDURE — 71046 X-RAY EXAM CHEST 2 VIEWS: CPT

## 2020-06-23 PROCEDURE — 80053 COMPREHEN METABOLIC PANEL: CPT | Performed by: FAMILY MEDICINE

## 2020-06-23 PROCEDURE — 85025 COMPLETE CBC W/AUTO DIFF WBC: CPT | Performed by: FAMILY MEDICINE

## 2020-06-23 PROCEDURE — 84484 ASSAY OF TROPONIN QUANT: CPT | Performed by: FAMILY MEDICINE

## 2020-06-23 PROCEDURE — 92960 CARDIOVERSION ELECTRIC EXT: CPT | Performed by: FAMILY MEDICINE

## 2020-06-23 PROCEDURE — 6360000200 HC RX 636 W HCPCS (ALT 250 FOR IP)

## 2020-06-23 PROCEDURE — 93005 ELECTROCARDIOGRAM TRACING: CPT | Performed by: FAMILY MEDICINE

## 2020-06-23 PROCEDURE — 6360000200 HC RX 636 W HCPCS (ALT 250 FOR IP): Performed by: FAMILY MEDICINE

## 2020-06-23 PROCEDURE — 6370000100 HC RX 637 (ALT 250 FOR IP): Performed by: FAMILY MEDICINE

## 2020-06-23 PROCEDURE — 93010 ELECTROCARDIOGRAM REPORT: CPT | Mod: 59,51 | Performed by: FAMILY MEDICINE

## 2020-06-23 PROCEDURE — 96368 THER/DIAG CONCURRENT INF: CPT

## 2020-06-23 PROCEDURE — 99153 MOD SED SAME PHYS/QHP EA: CPT

## 2020-06-23 PROCEDURE — 92960 CARDIOVERSION ELECTRIC EXT: CPT

## 2020-06-23 PROCEDURE — 71046 X-RAY EXAM CHEST 2 VIEWS: CPT | Mod: 26 | Performed by: RADIOLOGY

## 2020-06-23 PROCEDURE — 2580000300 HC RX 258: Performed by: FAMILY MEDICINE

## 2020-06-23 PROCEDURE — 96375 TX/PRO/DX INJ NEW DRUG ADDON: CPT

## 2020-06-23 PROCEDURE — 85610 PROTHROMBIN TIME: CPT | Performed by: FAMILY MEDICINE

## 2020-06-23 PROCEDURE — 99285 EMERGENCY DEPT VISIT HI MDM: CPT | Mod: 25 | Performed by: FAMILY MEDICINE

## 2020-06-23 PROCEDURE — 99152 MOD SED SAME PHYS/QHP 5/>YRS: CPT | Mod: 51 | Performed by: FAMILY MEDICINE

## 2020-06-23 PROCEDURE — 96366 THER/PROPH/DIAG IV INF ADDON: CPT

## 2020-06-23 RX ORDER — PROPOFOL 10 MG/ML
INJECTION, EMULSION INTRAVENOUS
Status: COMPLETED
Start: 2020-06-23 | End: 2020-06-23

## 2020-06-23 RX ORDER — PRASUGREL 10 MG/1
5 TABLET, FILM COATED ORAL DAILY
COMMUNITY

## 2020-06-23 RX ORDER — EZETIMIBE 10 MG/1
10 TABLET ORAL DAILY
COMMUNITY

## 2020-06-23 RX ORDER — MIDAZOLAM HYDROCHLORIDE 1 MG/ML
2 INJECTION INTRAMUSCULAR; INTRAVENOUS ONCE
Status: COMPLETED | OUTPATIENT
Start: 2020-06-23 | End: 2020-06-23

## 2020-06-23 RX ORDER — METOPROLOL TARTRATE 1 MG/ML
5 INJECTION, SOLUTION INTRAVENOUS ONCE
Status: COMPLETED | OUTPATIENT
Start: 2020-06-23 | End: 2020-06-23

## 2020-06-23 RX ORDER — PROPOFOL 10 MG/ML
50 INJECTION, EMULSION INTRAVENOUS ONCE
Status: COMPLETED | OUTPATIENT
Start: 2020-06-23 | End: 2020-06-23

## 2020-06-23 RX ORDER — FOLIC ACID 1 MG/1
800 TABLET ORAL DAILY
COMMUNITY

## 2020-06-23 RX ORDER — IBUPROFEN 100 MG/5ML
1000 SUSPENSION, ORAL (FINAL DOSE FORM) ORAL DAILY
COMMUNITY

## 2020-06-23 RX ORDER — SODIUM CHLORIDE 9 MG/ML
1000 INJECTION, SOLUTION INTRAVENOUS ONCE
Status: COMPLETED | OUTPATIENT
Start: 2020-06-23 | End: 2020-06-23

## 2020-06-23 RX ORDER — PSYLLIUM HUSK 0.4 G
200 CAPSULE ORAL DAILY
COMMUNITY

## 2020-06-23 RX ORDER — CELECOXIB 200 MG/1
200 CAPSULE ORAL 2 TIMES DAILY
COMMUNITY

## 2020-06-23 RX ORDER — LANOLIN ALCOHOL/MO/W.PET/CERES
1000 CREAM (GRAM) TOPICAL DAILY
COMMUNITY

## 2020-06-23 RX ORDER — MAGNESIUM SULFATE HEPTAHYDRATE 40 MG/ML
2 INJECTION, SOLUTION INTRAVENOUS ONCE
Status: COMPLETED | OUTPATIENT
Start: 2020-06-23 | End: 2020-06-23

## 2020-06-23 RX ORDER — POTASSIUM CHLORIDE 750 MG/1
40 TABLET, FILM COATED, EXTENDED RELEASE ORAL ONCE
Status: COMPLETED | OUTPATIENT
Start: 2020-06-23 | End: 2020-06-23

## 2020-06-23 RX ORDER — POTASSIUM CHLORIDE 7.45 MG/ML
10 INJECTION INTRAVENOUS ONCE
Status: COMPLETED | OUTPATIENT
Start: 2020-06-23 | End: 2020-06-23

## 2020-06-23 RX ORDER — DILTIAZEM HYDROCHLORIDE 5 MG/ML
10 INJECTION INTRAVENOUS ONCE
Status: DISCONTINUED | OUTPATIENT
Start: 2020-06-23 | End: 2020-06-23 | Stop reason: HOSPADM

## 2020-06-23 RX ORDER — CALCIUM CARB/VITAMIN D3/VIT K1 500-500-40
TABLET,CHEWABLE ORAL
COMMUNITY

## 2020-06-23 RX ORDER — ATENOLOL 50 MG/1
50 TABLET ORAL DAILY
COMMUNITY

## 2020-06-23 RX ORDER — MIDAZOLAM HYDROCHLORIDE 1 MG/ML
INJECTION INTRAMUSCULAR; INTRAVENOUS
Status: COMPLETED
Start: 2020-06-23 | End: 2020-06-23

## 2020-06-23 RX ORDER — PANTOPRAZOLE SODIUM 40 MG/1
40 TABLET, DELAYED RELEASE ORAL DAILY
COMMUNITY

## 2020-06-23 RX ORDER — ATORVASTATIN CALCIUM 40 MG/1
40 TABLET, FILM COATED ORAL DAILY
COMMUNITY

## 2020-06-23 RX ORDER — AMLODIPINE AND VALSARTAN 10; 320 MG/1; MG/1
1 TABLET ORAL DAILY
COMMUNITY

## 2020-06-23 RX ORDER — SODIUM CHLORIDE 9 MG/ML
INJECTION, SOLUTION INTRAVENOUS
Status: COMPLETED | OUTPATIENT
Start: 2020-06-23 | End: 2020-06-23

## 2020-06-23 RX ADMIN — POTASSIUM CHLORIDE 40 MEQ: 750 TABLET, FILM COATED, EXTENDED RELEASE ORAL at 02:27

## 2020-06-23 RX ADMIN — SODIUM CHLORIDE 999 ML/HR: 9 INJECTION, SOLUTION INTRAVENOUS at 04:01

## 2020-06-23 RX ADMIN — SODIUM CHLORIDE 1000 ML: 9 INJECTION, SOLUTION INTRAVENOUS at 01:34

## 2020-06-23 RX ADMIN — PROPOFOL 50 MG: 10 INJECTION, EMULSION INTRAVENOUS at 04:10

## 2020-06-23 RX ADMIN — POTASSIUM CHLORIDE 10 MEQ: 7.46 INJECTION, SOLUTION INTRAVENOUS at 02:34

## 2020-06-23 RX ADMIN — MIDAZOLAM 2 MG: 1 INJECTION INTRAMUSCULAR; INTRAVENOUS at 04:06

## 2020-06-23 RX ADMIN — MAGNESIUM SULFATE HEPTAHYDRATE 2 G: 40 INJECTION, SOLUTION INTRAVENOUS at 02:30

## 2020-06-23 RX ADMIN — METOPROLOL TARTRATE 5 MG: 5 INJECTION INTRAVENOUS at 01:32

## 2020-06-23 RX ADMIN — MIDAZOLAM HYDROCHLORIDE 2 MG: 1 INJECTION INTRAMUSCULAR; INTRAVENOUS at 04:06

## 2020-06-23 RX ADMIN — PROPOFOL 10 MG: 10 INJECTION, EMULSION INTRAVENOUS at 04:15

## 2020-06-23 ASSESSMENT — ENCOUNTER SYMPTOMS
BACK PAIN: 0
FEVER: 0
SORE THROAT: 0
HEMATURIA: 0
COUGH: 0
SYNCOPE: 0
COLOR CHANGE: 0
VOMITING: 0
SEIZURES: 0
DYSURIA: 0
PALPITATIONS: 1
SHORTNESS OF BREATH: 0
ARTHRALGIAS: 0
ABDOMINAL PAIN: 0
NAUSEA: 0
EYE PAIN: 0
CHILLS: 0
NEAR-SYNCOPE: 0

## 2020-06-23 NOTE — DISCHARGE INSTRUCTIONS
Continue all current medications as prescribed.  Rest.  No strenuous or exertional activities for the next 24 to 36 hours.  Avoid any stimulant medications or alcohol for the next 72 hours.  Drink lots of fluids.  Follow-up with cardiology in 1 to 2 weeks.  Start Eliquis anticoagulation.

## 2020-06-23 NOTE — ED PROVIDER NOTES
HPI:  Chief Complaint   Patient presents with   • Palpitations     Palpitations x 45 minutes.  lightheadedness       59-year-old male presents with palpitations.  He states he woke up around 1230 this morning with his heart racing.  He did feel somewhat lightheaded.  The patient denies any chest pain.  He denies any shortness of breath.  He does have a history of coronary disease.  He has had 12-15 stents over the past several years.  He last had a single-vessel bypass of the LAD in 2015.  He does have a history of hypertension.  He does drink alcohol occasionally.  He denies any stimulant use.  He has not taken any medication such as cold medicine or decongestants.      History provided by:  Patient and spouse   used: No    Palpitations   Palpitations quality:  Fast  Onset quality:  Sudden  Duration:  90 minutes  Timing:  Intermittent  Progression:  Waxing and waning  Chronicity:  New  Context: exercise    Context: not anxiety, not caffeine, not illicit drugs, not nicotine and not stimulant use    Relieved by:  None tried  Worsened by:  Nothing  Ineffective treatments:  None tried  Associated symptoms: no back pain, no chest pain, no cough, no nausea, no near-syncope, no shortness of breath, no syncope and no vomiting    Risk factors: diabetes mellitus and heart disease    Risk factors: no hx of atrial fibrillation, no hx of PE and no hx of thyroid disease        HISTORY:  History reviewed. No pertinent past medical history.    Past Surgical History:   Procedure Laterality Date   • APPENDECTOMY     • OTHER SURGICAL HISTORY      cardiac stents    • TONSILLECTOMY         History reviewed. No pertinent family history.    Social History     Tobacco Use   • Smoking status: Never Smoker   • Smokeless tobacco: Never Used   Substance Use Topics   • Alcohol use: Yes     Comment: occasional   • Drug use: Never         ROS:  Review of Systems   Constitutional: Negative for chills and fever.   HENT:  Negative for ear pain and sore throat.    Eyes: Negative for pain and visual disturbance.   Respiratory: Negative for cough and shortness of breath.    Cardiovascular: Positive for palpitations. Negative for chest pain, syncope and near-syncope.   Gastrointestinal: Negative for abdominal pain, nausea and vomiting.   Genitourinary: Negative for dysuria and hematuria.   Musculoskeletal: Negative for arthralgias and back pain.   Skin: Negative for color change and rash.   Neurological: Negative for seizures and syncope.   All other systems reviewed and are negative.      PE:  ED Triage Vitals   Temp Heart Rate Resp BP SpO2   06/23/20 0116 06/23/20 0116 06/23/20 0116 06/23/20 0116 06/23/20 0116   36.7 °C (98 °F) 78 18 (!) 168/102 99 %      Temp Source Heart Rate Source Patient Position BP Location FiO2 (%)   06/23/20 0116 06/23/20 0430 -- -- --   Temporal Monitor          Physical Exam  Vitals signs and nursing note reviewed.   Constitutional:       Appearance: He is well-developed.   HENT:      Head: Normocephalic and atraumatic.   Eyes:      Conjunctiva/sclera: Conjunctivae normal.      Pupils: Pupils are equal, round, and reactive to light.   Neck:      Musculoskeletal: Normal range of motion and neck supple.   Cardiovascular:      Rate and Rhythm: Normal rate. Rhythm irregularly irregular. Occasional extrasystoles are present.     Pulses: Normal pulses.      Heart sounds: Normal heart sounds. No murmur.   Pulmonary:      Effort: Pulmonary effort is normal. No respiratory distress.      Breath sounds: Normal breath sounds.   Abdominal:      General: Bowel sounds are normal.      Palpations: Abdomen is soft.      Tenderness: There is no abdominal tenderness.   Musculoskeletal: Normal range of motion.   Skin:     General: Skin is warm and dry.      Capillary Refill: Capillary refill takes less than 2 seconds.   Neurological:      Mental Status: He is alert and oriented to person, place, and time.         ED LABS:  Labs  Reviewed   CBC WITH AUTO DIFFERENTIAL - Abnormal       Result Value    WBC 7.7      RBC 4.37      Hemoglobin 15.0      Hematocrit 43.5      MCV 99.5 (*)     MCH 34.4 (*)     MCHC 34.5      RDW 12.6      Platelets 248      MPV 6.9      Neutrophils% 42 (*)     Lymphocytes% 47      Monocytes% 8      Eosinophils% 2      Basophils% 1      Neutrophils Absolute 3.30      Lymphocytes Absolute 3.60      Monocytes Absolute 0.60      Eosinophils Absolute 0.20      Basophils Absolute 0.10     COMPREHENSIVE METABOLIC PANEL - Abnormal    Sodium 140      Potassium 3.2 (*)     Chloride 106      CO2 23      Anion Gap 11      BUN 11      Creatinine 0.79      Glucose 123 (*)     Calcium 9.9      AST 35      ALT (SGPT) 44      Alkaline Phosphatase 76      Total Protein 8.0      Albumin 4.9      Total Bilirubin 0.36      eGFR 97      Corrected Calcium 9.2      Narrative:     Estimated GFR calculated using the 2009 CKD-EPI creatinine equation.   MAGNESIUM - Abnormal    Magnesium 1.6 (*)    PROTIME-INR - Normal    Protime 10.2      INR 0.9     B-TYPE NATRIURETIC PEPTIDE (BNP) - Normal    BNP 59     HIGH SENSITIVE TROPONIN I - Normal    hsTnI 0 Hour 4.0     HIGH SENSITIVE TROPONIN I, 1 HOUR - Normal    hsTnI 1 hr 3.8      Delta from 0 Hour 0.2     HIGH SENSITIVE TROPONIN I PANEL (0HR, 1HR, 2HR)    Narrative:     The following orders were created for panel order HS Troponin I Panel (0HR, 1HR, 2HR) Blood, Venous.  Procedure                               Abnormality         Status                     ---------                               -----------         ------                     HS Troponin I[[id number]]                 Normal              Final result               1HR High Sensitive Trop I[[id number]]     Normal              Final result               2HR High Sensitive Tropon...[[id number]]                                                   Please view results for these tests on the individual orders.         ED IMAGES:  X-ray chest 2  views    (Results Pending)       ED PROCEDURES:  Cardioversion    Date/Time: 6/23/2020 5:01 AM  Performed by: Ariel Gutiérrez MD  Authorized by: Ariel Gutiérrez MD     Consent:     Consent obtained:  Written    Consent given by:  Patient    Risks discussed:  Pain, induced arrhythmia and cutaneous burn    Alternatives discussed:  Rate-control medication  Pre-procedure details:     Electrode placement:  Anterior-lateral  Attempt one:     Cardioversion mode:  Synchronous    Waveform:  Biphasic    Shock (Joules):  200    Shock outcome:  Conversion to normal sinus rhythm  Post-procedure details:     Patient status:  Awake    Patient tolerance of procedure:  Tolerated well, no immediate complications  Procedural Sedation    Date/Time: 6/23/2020 5:02 AM  Performed by: Ariel Gutiérrez MD  Authorized by: Ariel Gutiérrez MD     Consent:     Consent obtained:  Written/electronic    Consent given by:  Patient  Indications:     Procedure performed:  Cardioversion    Procedure Performed by:  Physician performing sedation    Intended level of sedation:  Moderate (conscious sedation)  Pre-sedation assessment:     Time since last food or drink:  6 hrs    ASA classification: class 2 - patient with mild systemic disease      Neck mobility:  Full    Mouth opening:  3 or more finger widths    Thyromental distance:  4 finger widths    Mallampati score:  I - soft palate, uvula, fauces, pillars visible    Pre-sedation assessments completed and reviewed: hydration status      Pre-sedation assessments completed and reviewed: pre-procedure airway patency not reviewed, pre-procedure cardiovascular function not reviewed, pre-procedure mental status not reviewed and pre-procedure respiratory function not reviewed      History of difficult intubation: no      Pre-sedation assessment completed:  6/23/2020 3:50 AM  Immediate pre-procedure details:     Reassessment: Patient reassessed immediately prior to procedure      Reviewed: vital signs, relevant labs/tests  "and NPO status    Procedure details (see MAR for exact dosages):     Sedation start time:  6/23/2020 4:01 AM    Preoxygenation:  Nasal cannula    Medications:  Propofol and midazolam    Intra-procedure monitoring:  Blood pressure monitoring, continuous pulse oximetry, LOC assessments q5\", vital sign checks q5\" and EKG    Intra-procedure events: none      Sedation end time:  6/23/2020 4:35 AM    Total sedation time (minutes):  34  Post-procedure details:     Post-sedation assessment completed:  6/23/2020 5:05 AM    Attendance: Constant attendance by certified staff until patient recovered      Recovery: Patient returned to pre-procedure baseline      Patient Participation:  Complete - patient participated    Level of Consciousness:  Awake and alert    Pain Score:  0    Pain Management:  Adequate    Airway Patency:  Patent    Anesthetic Complications: No      Cardiovascular Status:  Acceptable, hemodynamically stable and blood pressure returned to baseline    Respiratory Status:  Acceptable    Postoperative Hydration:  Acceptable    Patient is stable for discharge or admission: yes      Patient tolerance:  Tolerated well, no immediate complications        ED COURSE:     59-year-old male presents with A. fib.  Patient is seen and examined upon arrival.  Examination is detailed above but does show a 59-year-old to be in rapid A. fib.  Initial heart rate was in the 130s.  The patient is symptomatic with a heart rate in the 30s with some lightheadedness.  He denies any chest pain or pressure.  Heart rate is irregularly irregular.  Lungs are clear, abdomen is soft, no edema in the extremities.  EKG does show A. fib.  Patient is given IV Lopressor.  This did control his rate for the most part, with his heart rate increasing when he get up to urinate.  Labs were unremarkable.  Magnesium was slightly low at 1.6 this was replaced with 2 g of IV magnesium.  Potassium was low at 3.2.  He was given IV and oral potassium.  He did " have episodes of bradycardia into the upper 30s and low 40s on several occasions.  These were symptomatic as he stated he felt a heavy sensation.  These would last only a few seconds.  I did call and discussed this with Dr. Pearson, cardiologist on-call, who did agree that cardioversion would be appropriate.  I discussed with the patient and his wife and we did elect to cardiovert him.  He is only been in A. Fib 3 hours or so so I do feel it is safe.  Patient is sedated with Versed and propofol and cardioverted on the first time with 200 J synchronized.  He was converted to a sinus rhythm in the 50s.  His chads vascular score is 3 so I do feel he would benefit from anticoagulation.  I am going to start him on Eliquis in addition to his Effient.  I discussed this with the patient and his wife as well.  I recommended follow-up with her cardiologist in the next week or 2.  He is to continue all of his medications as prescribed.  He is to avoid any stimulants such as caffeine and alcohol for the next several days.  He will return to the emergency room with any chest pain, fast heart rate or palpitations or lightheadedness or dizziness.  He will otherwise follow-up as needed.       MDM:  MDM  Number of Diagnoses or Management Options     Amount and/or Complexity of Data Reviewed  Clinical lab tests: reviewed and ordered  Tests in the radiology section of CPT®: ordered and reviewed  Discuss the patient with other providers: yes ([facility])    Risk of Complications, Morbidity, and/or Mortality  Presenting problems: moderate  Diagnostic procedures: moderate  Management options: moderate    Patient Progress  Patient progress: improved      Final diagnoses:   [I48.91] Atrial fibrillation (CMS/[facility]) ([facility])        Ariel Gutiérrez MD  06/23/20 0550

## 2020-12-01 ENCOUNTER — TELEPHONE (OUTPATIENT)
Dept: DERMATOLOGY | Facility: CLINIC | Age: 60
End: 2020-12-01

## 2020-12-01 ENCOUNTER — TELEPHONE (OUTPATIENT)
Dept: RHEUMATOLOGY | Facility: CLINIC | Age: 60
End: 2020-12-01

## 2020-12-01 NOTE — TELEPHONE ENCOUNTER
Returned patient call and told him that we do not do general dermatology. I gave him the names of the derms here. [place]

## 2020-12-02 ENCOUNTER — PATIENT MESSAGE (OUTPATIENT)
Dept: GASTROENTEROLOGY | Facility: CLINIC | Age: 60
End: 2020-12-02

## 2020-12-02 ENCOUNTER — TELEPHONE (OUTPATIENT)
Dept: RHEUMATOLOGY | Facility: CLINIC | Age: 60
End: 2020-12-02

## 2020-12-02 ENCOUNTER — PATIENT MESSAGE (OUTPATIENT)
Dept: RHEUMATOLOGY | Facility: CLINIC | Age: 60
End: 2020-12-02

## 2020-12-02 NOTE — TELEPHONE ENCOUNTER
Had mohs excison of squamous cell carcinoma in [place].  Need to establish with local dermatologist

## 2020-12-03 DIAGNOSIS — Z79.899 ENCOUNTER FOR LONG-TERM (CURRENT) DRUG USE: ICD-10-CM

## 2020-12-03 DIAGNOSIS — M19.90 CHRONIC INFLAMMATORY ARTHRITIS: Primary | ICD-10-CM

## 2020-12-10 ENCOUNTER — TELEPHONE (OUTPATIENT)
Dept: GASTROENTEROLOGY | Facility: CLINIC | Age: 60
End: 2020-12-10

## 2020-12-10 ENCOUNTER — PATIENT MESSAGE (OUTPATIENT)
Dept: GASTROENTEROLOGY | Facility: CLINIC | Age: 60
End: 2020-12-10

## 2020-12-10 DIAGNOSIS — Z01.812 PRE-PROCEDURE LAB EXAM: ICD-10-CM

## 2020-12-15 ENCOUNTER — PATIENT MESSAGE (OUTPATIENT)
Dept: GASTROENTEROLOGY | Facility: CLINIC | Age: 60
End: 2020-12-15

## 2020-12-31 PROBLEM — I48.0 PAF (PAROXYSMAL ATRIAL FIBRILLATION): Status: ACTIVE | Noted: 2020-12-31

## 2021-01-05 ENCOUNTER — OFFICE VISIT (OUTPATIENT)
Dept: RHEUMATOLOGY | Facility: CLINIC | Age: 61
End: 2021-01-05
Payer: OTHER GOVERNMENT

## 2021-01-05 ENCOUNTER — PATIENT MESSAGE (OUTPATIENT)
Dept: RHEUMATOLOGY | Facility: CLINIC | Age: 61
End: 2021-01-05

## 2021-01-05 VITALS
TEMPERATURE: 98 F | DIASTOLIC BLOOD PRESSURE: 74 MMHG | BODY MASS INDEX: 24.64 KG/M2 | WEIGHT: 171.69 LBS | SYSTOLIC BLOOD PRESSURE: 137 MMHG

## 2021-01-05 DIAGNOSIS — M25.541 ARTHRALGIA OF BOTH HANDS: ICD-10-CM

## 2021-01-05 DIAGNOSIS — Z79.899 ENCOUNTER FOR LONG-TERM (CURRENT) DRUG USE: ICD-10-CM

## 2021-01-05 DIAGNOSIS — M19.90 OSTEOARTHRITIS, UNSPECIFIED OSTEOARTHRITIS TYPE, UNSPECIFIED SITE: Primary | ICD-10-CM

## 2021-01-05 DIAGNOSIS — M25.542 ARTHRALGIA OF BOTH HANDS: ICD-10-CM

## 2021-01-05 DIAGNOSIS — M19.90 CHRONIC INFLAMMATORY ARTHRITIS: ICD-10-CM

## 2021-01-05 PROCEDURE — 99214 PR OFFICE/OUTPT VISIT, EST, LEVL IV, 30-39 MIN: ICD-10-PCS | Mod: S$GLB,,, | Performed by: INTERNAL MEDICINE

## 2021-01-05 PROCEDURE — 99214 OFFICE O/P EST MOD 30 MIN: CPT | Mod: S$GLB,,, | Performed by: INTERNAL MEDICINE

## 2021-01-05 RX ORDER — HYDROXYCHLOROQUINE SULFATE 200 MG/1
200 TABLET, FILM COATED ORAL 2 TIMES DAILY
Qty: 180 TABLET | Refills: 1 | Status: SHIPPED | OUTPATIENT
Start: 2021-01-05 | End: 2021-06-19 | Stop reason: SDUPTHER

## 2021-01-05 RX ORDER — CELECOXIB 200 MG/1
200 CAPSULE ORAL 2 TIMES DAILY
Qty: 180 CAPSULE | Refills: 1 | Status: SHIPPED | OUTPATIENT
Start: 2021-01-05 | End: 2021-06-19 | Stop reason: SDUPTHER

## 2021-01-15 ENCOUNTER — OFFICE VISIT (OUTPATIENT)
Dept: DERMATOLOGY | Facility: CLINIC | Age: 61
End: 2021-01-15
Payer: OTHER GOVERNMENT

## 2021-01-15 ENCOUNTER — TELEPHONE (OUTPATIENT)
Dept: GASTROENTEROLOGY | Facility: CLINIC | Age: 61
End: 2021-01-15

## 2021-01-15 VITALS — BODY MASS INDEX: 23.77 KG/M2 | WEIGHT: 166 LBS | HEIGHT: 70 IN | RESPIRATION RATE: 18 BRPM

## 2021-01-15 DIAGNOSIS — Z12.83 SCREENING EXAM FOR SKIN CANCER: ICD-10-CM

## 2021-01-15 DIAGNOSIS — D22.9 MULTIPLE BENIGN NEVI: ICD-10-CM

## 2021-01-15 DIAGNOSIS — Z85.828 HX OF NONMELANOMA SKIN CANCER: ICD-10-CM

## 2021-01-15 DIAGNOSIS — D18.01 ANGIOMA OF SKIN: ICD-10-CM

## 2021-01-15 DIAGNOSIS — L73.8 SEBACEOUS HYPERPLASIA: ICD-10-CM

## 2021-01-15 DIAGNOSIS — D48.9 NEOPLASM OF UNCERTAIN BEHAVIOR: Primary | ICD-10-CM

## 2021-01-15 DIAGNOSIS — L81.4 LENTIGINES: ICD-10-CM

## 2021-01-15 DIAGNOSIS — L82.1 SEBORRHEIC KERATOSES: ICD-10-CM

## 2021-01-15 DIAGNOSIS — L72.0 MILIA: ICD-10-CM

## 2021-01-15 PROCEDURE — 99999 PR PBB SHADOW E&M-EST. PATIENT-LVL IV: ICD-10-PCS | Mod: PBBFAC,,, | Performed by: DERMATOLOGY

## 2021-01-15 PROCEDURE — 99214 OFFICE O/P EST MOD 30 MIN: CPT | Mod: PBBFAC,PO,25 | Performed by: DERMATOLOGY

## 2021-01-15 PROCEDURE — 88305 TISSUE EXAM BY PATHOLOGIST: ICD-10-PCS | Mod: 26,,, | Performed by: PATHOLOGY

## 2021-01-15 PROCEDURE — 99203 OFFICE O/P NEW LOW 30 MIN: CPT | Mod: 25,S$PBB,, | Performed by: DERMATOLOGY

## 2021-01-15 PROCEDURE — 11102 TANGNTL BX SKIN SINGLE LES: CPT | Mod: S$PBB,,, | Performed by: DERMATOLOGY

## 2021-01-15 PROCEDURE — 88305 TISSUE EXAM BY PATHOLOGIST: CPT | Performed by: PATHOLOGY

## 2021-01-15 PROCEDURE — 99203 PR OFFICE/OUTPT VISIT, NEW, LEVL III, 30-44 MIN: ICD-10-PCS | Mod: 25,S$PBB,, | Performed by: DERMATOLOGY

## 2021-01-15 PROCEDURE — 99999 PR PBB SHADOW E&M-EST. PATIENT-LVL IV: CPT | Mod: PBBFAC,,, | Performed by: DERMATOLOGY

## 2021-01-15 PROCEDURE — 11102 TANGNTL BX SKIN SINGLE LES: CPT | Mod: PBBFAC,PO | Performed by: DERMATOLOGY

## 2021-01-15 PROCEDURE — 11102 PR TANGENTIAL BIOPSY, SKIN, SINGLE LESION: ICD-10-PCS | Mod: S$PBB,,, | Performed by: DERMATOLOGY

## 2021-01-15 PROCEDURE — 88305 TISSUE EXAM BY PATHOLOGIST: CPT | Mod: 26,,, | Performed by: PATHOLOGY

## 2021-01-19 ENCOUNTER — LAB VISIT (OUTPATIENT)
Dept: FAMILY MEDICINE | Facility: CLINIC | Age: 61
End: 2021-01-19
Payer: OTHER GOVERNMENT

## 2021-01-19 DIAGNOSIS — Z01.812 PRE-PROCEDURE LAB EXAM: ICD-10-CM

## 2021-01-19 PROCEDURE — U0003 INFECTIOUS AGENT DETECTION BY NUCLEIC ACID (DNA OR RNA); SEVERE ACUTE RESPIRATORY SYNDROME CORONAVIRUS 2 (SARS-COV-2) (CORONAVIRUS DISEASE [COVID-19]), AMPLIFIED PROBE TECHNIQUE, MAKING USE OF HIGH THROUGHPUT TECHNOLOGIES AS DESCRIBED BY CMS-2020-01-R: HCPCS

## 2021-01-20 LAB
FINAL PATHOLOGIC DIAGNOSIS: NORMAL
GROSS: NORMAL
SARS-COV-2 RNA RESP QL NAA+PROBE: NOT DETECTED

## 2021-01-21 ENCOUNTER — ANESTHESIA EVENT (OUTPATIENT)
Dept: ENDOSCOPY | Facility: HOSPITAL | Age: 61
End: 2021-01-21
Payer: OTHER GOVERNMENT

## 2021-01-21 ENCOUNTER — HOSPITAL ENCOUNTER (OUTPATIENT)
Facility: HOSPITAL | Age: 61
Discharge: HOME | End: 2021-01-21
Attending: INTERNAL MEDICINE | Admitting: INTERNAL MEDICINE
Payer: OTHER GOVERNMENT

## 2021-01-21 ENCOUNTER — ANESTHESIA (OUTPATIENT)
Dept: ENDOSCOPY | Facility: HOSPITAL | Age: 61
End: 2021-01-21
Payer: OTHER GOVERNMENT

## 2021-01-21 DIAGNOSIS — R13.10 DYSPHAGIA: ICD-10-CM

## 2021-01-21 LAB — GLUCOSE SERPL-MCNC: 99 MG/DL (ref 70–110)

## 2021-01-21 PROCEDURE — 43248 EGD GUIDE WIRE INSERTION: CPT | Mod: ,,, | Performed by: INTERNAL MEDICINE

## 2021-01-21 PROCEDURE — 43239 EGD BIOPSY SINGLE/MULTIPLE: CPT | Mod: PO | Performed by: INTERNAL MEDICINE

## 2021-01-21 PROCEDURE — 37000008 HC ANESTHESIA 1ST 15 MINUTES: Mod: PO | Performed by: INTERNAL MEDICINE

## 2021-01-21 PROCEDURE — 00813 ANES UPR LWR GI NDSC PX: CPT | Mod: PO | Performed by: INTERNAL MEDICINE

## 2021-01-21 PROCEDURE — 82962 GLUCOSE BLOOD TEST: CPT | Mod: PO | Performed by: INTERNAL MEDICINE

## 2021-01-21 PROCEDURE — 88305 TISSUE EXAM BY PATHOLOGIST: CPT | Mod: 26,,, | Performed by: PATHOLOGY

## 2021-01-21 PROCEDURE — D9220A PRA ANESTHESIA: ICD-10-PCS | Mod: CRNA,,, | Performed by: NURSE ANESTHETIST, CERTIFIED REGISTERED

## 2021-01-21 PROCEDURE — 63600175 PHARM REV CODE 636 W HCPCS: Mod: PO | Performed by: INTERNAL MEDICINE

## 2021-01-21 PROCEDURE — 43248 EGD GUIDE WIRE INSERTION: CPT | Mod: PO | Performed by: INTERNAL MEDICINE

## 2021-01-21 PROCEDURE — D9220A PRA ANESTHESIA: Mod: ANES,,, | Performed by: ANESTHESIOLOGY

## 2021-01-21 PROCEDURE — 88305 TISSUE EXAM BY PATHOLOGIST: CPT | Performed by: PATHOLOGY

## 2021-01-21 PROCEDURE — 43239 EGD BIOPSY SINGLE/MULTIPLE: CPT | Mod: 59,,, | Performed by: INTERNAL MEDICINE

## 2021-01-21 PROCEDURE — 25000003 PHARM REV CODE 250: Mod: PO | Performed by: NURSE ANESTHETIST, CERTIFIED REGISTERED

## 2021-01-21 PROCEDURE — 63600175 PHARM REV CODE 636 W HCPCS: Mod: PO | Performed by: NURSE ANESTHETIST, CERTIFIED REGISTERED

## 2021-01-21 PROCEDURE — 37000009 HC ANESTHESIA EA ADD 15 MINS: Mod: PO | Performed by: INTERNAL MEDICINE

## 2021-01-21 PROCEDURE — 27201012 HC FORCEPS, HOT/COLD, DISP: Mod: PO | Performed by: INTERNAL MEDICINE

## 2021-01-21 PROCEDURE — D9220A PRA ANESTHESIA: ICD-10-PCS | Mod: ANES,,, | Performed by: ANESTHESIOLOGY

## 2021-01-21 PROCEDURE — 43248 PR EGD, FLEX, W/DILATION OVER GUIDEWIRE: ICD-10-PCS | Mod: ,,, | Performed by: INTERNAL MEDICINE

## 2021-01-21 PROCEDURE — 88305 TISSUE EXAM BY PATHOLOGIST: ICD-10-PCS | Mod: 26,,, | Performed by: PATHOLOGY

## 2021-01-21 PROCEDURE — D9220A PRA ANESTHESIA: Mod: CRNA,,, | Performed by: NURSE ANESTHETIST, CERTIFIED REGISTERED

## 2021-01-21 PROCEDURE — 43239 PR EGD, FLEX, W/BIOPSY, SGL/MULTI: ICD-10-PCS | Mod: 59,,, | Performed by: INTERNAL MEDICINE

## 2021-01-21 RX ORDER — FENTANYL CITRATE 50 UG/ML
INJECTION, SOLUTION INTRAMUSCULAR; INTRAVENOUS
Status: DISCONTINUED | OUTPATIENT
Start: 2021-01-21 | End: 2021-01-21

## 2021-01-21 RX ORDER — SODIUM CHLORIDE 0.9 % (FLUSH) 0.9 %
10 SYRINGE (ML) INJECTION
Status: DISCONTINUED | OUTPATIENT
Start: 2021-01-21 | End: 2021-01-21 | Stop reason: HOSPADM

## 2021-01-21 RX ORDER — LIDOCAINE HCL/PF 100 MG/5ML
SYRINGE (ML) INTRAVENOUS
Status: DISCONTINUED | OUTPATIENT
Start: 2021-01-21 | End: 2021-01-21

## 2021-01-21 RX ORDER — SODIUM CHLORIDE, SODIUM LACTATE, POTASSIUM CHLORIDE, CALCIUM CHLORIDE 600; 310; 30; 20 MG/100ML; MG/100ML; MG/100ML; MG/100ML
INJECTION, SOLUTION INTRAVENOUS CONTINUOUS
Status: DISCONTINUED | OUTPATIENT
Start: 2021-01-21 | End: 2021-01-21 | Stop reason: HOSPADM

## 2021-01-21 RX ORDER — PROPOFOL 10 MG/ML
VIAL (ML) INTRAVENOUS CONTINUOUS PRN
Status: DISCONTINUED | OUTPATIENT
Start: 2021-01-21 | End: 2021-01-21

## 2021-01-21 RX ORDER — PROPOFOL 10 MG/ML
VIAL (ML) INTRAVENOUS
Status: DISCONTINUED | OUTPATIENT
Start: 2021-01-21 | End: 2021-01-21

## 2021-01-21 RX ADMIN — PROPOFOL 30 MG: 10 INJECTION, EMULSION INTRAVENOUS at 08:01

## 2021-01-21 RX ADMIN — PROPOFOL 50 MG: 10 INJECTION, EMULSION INTRAVENOUS at 08:01

## 2021-01-21 RX ADMIN — PROPOFOL 150 MCG/KG/MIN: 10 INJECTION, EMULSION INTRAVENOUS at 08:01

## 2021-01-21 RX ADMIN — LIDOCAINE HYDROCHLORIDE 100 MG: 20 INJECTION, SOLUTION INTRAVENOUS at 08:01

## 2021-01-21 RX ADMIN — GLYCOPYRROLATE 0.2 MG: 0.2 INJECTION, SOLUTION INTRAMUSCULAR; INTRAVITREAL at 08:01

## 2021-01-21 RX ADMIN — SODIUM CHLORIDE, SODIUM LACTATE, POTASSIUM CHLORIDE, AND CALCIUM CHLORIDE: .6; .31; .03; .02 INJECTION, SOLUTION INTRAVENOUS at 07:01

## 2021-01-21 RX ADMIN — FENTANYL CITRATE 100 MCG: 50 INJECTION, SOLUTION INTRAMUSCULAR; INTRAVENOUS at 08:01

## 2021-01-22 VITALS
HEART RATE: 78 BPM | SYSTOLIC BLOOD PRESSURE: 156 MMHG | DIASTOLIC BLOOD PRESSURE: 68 MMHG | RESPIRATION RATE: 16 BRPM | HEIGHT: 70 IN | BODY MASS INDEX: 22.9 KG/M2 | TEMPERATURE: 98 F | OXYGEN SATURATION: 99 % | WEIGHT: 160 LBS

## 2021-01-28 LAB
FINAL PATHOLOGIC DIAGNOSIS: NORMAL
GROSS: NORMAL
Lab: NORMAL

## 2021-05-03 ENCOUNTER — PATIENT MESSAGE (OUTPATIENT)
Dept: RHEUMATOLOGY | Facility: CLINIC | Age: 61
End: 2021-05-03

## 2021-06-02 ENCOUNTER — PATIENT MESSAGE (OUTPATIENT)
Dept: RHEUMATOLOGY | Facility: CLINIC | Age: 61
End: 2021-06-02

## 2021-06-07 ENCOUNTER — PATIENT MESSAGE (OUTPATIENT)
Dept: RHEUMATOLOGY | Facility: CLINIC | Age: 61
End: 2021-06-07

## 2021-09-26 PROBLEM — K29.70 GASTRITIS: Chronic | Status: ACTIVE | Noted: 2020-01-19

## 2021-09-26 PROBLEM — K22.2 ESOPHAGEAL STENOSIS: Chronic | Status: ACTIVE | Noted: 2020-01-19

## 2021-09-26 PROBLEM — Z79.899 ON STATIN THERAPY: Chronic | Status: ACTIVE | Noted: 2019-06-03

## 2021-09-26 PROBLEM — I15.2 HYPERTENSION COMPLICATING DIABETES: Chronic | Status: ACTIVE | Noted: 2020-01-12

## 2021-09-26 PROBLEM — M19.041 ARTHRITIS OF BOTH HANDS: Chronic | Status: ACTIVE | Noted: 2018-11-27

## 2021-09-26 PROBLEM — E83.52 HYPERCALCEMIA: Chronic | Status: ACTIVE | Noted: 2017-11-11

## 2021-09-26 PROBLEM — K20.90 ESOPHAGITIS: Chronic | Status: ACTIVE | Noted: 2020-01-19

## 2021-09-26 PROBLEM — R13.10 DYSPHAGIA: Chronic | Status: ACTIVE | Noted: 2018-05-24

## 2021-09-26 PROBLEM — I48.0 PAF (PAROXYSMAL ATRIAL FIBRILLATION): Chronic | Status: ACTIVE | Noted: 2020-12-31

## 2021-09-26 PROBLEM — R80.9 PROTEINURIA: Chronic | Status: ACTIVE | Noted: 2020-01-20

## 2021-09-26 PROBLEM — M19.042 ARTHRITIS OF BOTH HANDS: Chronic | Status: ACTIVE | Noted: 2018-11-27

## 2021-09-26 PROBLEM — K76.0 FATTY LIVER: Chronic | Status: ACTIVE | Noted: 2018-01-12

## 2021-09-26 PROBLEM — E11.59 HYPERTENSION COMPLICATING DIABETES: Chronic | Status: ACTIVE | Noted: 2020-01-12

## 2021-09-26 PROBLEM — I25.118 CORONARY ARTERY DISEASE OF NATIVE ARTERY OF NATIVE HEART WITH STABLE ANGINA PECTORIS: Chronic | Status: ACTIVE | Noted: 2019-06-06

## 2021-09-27 ENCOUNTER — OFFICE VISIT (OUTPATIENT)
Dept: DERMATOLOGY | Facility: CLINIC | Age: 61
End: 2021-09-27
Payer: OTHER GOVERNMENT

## 2021-09-27 VITALS — HEIGHT: 70 IN | RESPIRATION RATE: 18 BRPM | BODY MASS INDEX: 23.48 KG/M2 | WEIGHT: 164 LBS

## 2021-09-27 DIAGNOSIS — L82.1 SEBORRHEIC KERATOSES: Primary | ICD-10-CM

## 2021-09-27 DIAGNOSIS — L73.8 SEBACEOUS HYPERPLASIA: ICD-10-CM

## 2021-09-27 DIAGNOSIS — Z85.828 HX OF NONMELANOMA SKIN CANCER: ICD-10-CM

## 2021-09-27 DIAGNOSIS — Z12.83 SCREENING EXAM FOR SKIN CANCER: ICD-10-CM

## 2021-09-27 DIAGNOSIS — D22.9 BENIGN NEVUS: ICD-10-CM

## 2021-09-27 DIAGNOSIS — L57.0 ACTINIC KERATOSIS: ICD-10-CM

## 2021-09-27 PROCEDURE — 99212 OFFICE O/P EST SF 10 MIN: CPT | Mod: PBBFAC,PO | Performed by: DERMATOLOGY

## 2021-09-27 PROCEDURE — 99213 OFFICE O/P EST LOW 20 MIN: CPT | Mod: 25,S$PBB,, | Performed by: DERMATOLOGY

## 2021-09-27 PROCEDURE — 17003 DESTRUCT PREMALG LES 2-14: CPT | Mod: S$PBB,,, | Performed by: DERMATOLOGY

## 2021-09-27 PROCEDURE — 99999 PR PBB SHADOW E&M-EST. PATIENT-LVL II: CPT | Mod: PBBFAC,,, | Performed by: DERMATOLOGY

## 2021-09-27 PROCEDURE — 17003 DESTRUCT PREMALG LES 2-14: CPT | Mod: PBBFAC,PO | Performed by: DERMATOLOGY

## 2021-09-27 PROCEDURE — 99213 PR OFFICE/OUTPT VISIT, EST, LEVL III, 20-29 MIN: ICD-10-PCS | Mod: 25,S$PBB,, | Performed by: DERMATOLOGY

## 2021-09-27 PROCEDURE — 17000 PR DESTRUCTION(LASER SURGERY,CRYOSURGERY,CHEMOSURGERY),PREMALIGNANT LESIONS,FIRST LESION: ICD-10-PCS | Mod: S$PBB,,, | Performed by: DERMATOLOGY

## 2021-09-27 PROCEDURE — 17000 DESTRUCT PREMALG LESION: CPT | Mod: PBBFAC,PO | Performed by: DERMATOLOGY

## 2021-09-27 PROCEDURE — 17000 DESTRUCT PREMALG LESION: CPT | Mod: S$PBB,,, | Performed by: DERMATOLOGY

## 2021-09-27 PROCEDURE — 17003 DESTRUCTION, PREMALIGNANT LESIONS; SECOND THROUGH 14 LESIONS: ICD-10-PCS | Mod: S$PBB,,, | Performed by: DERMATOLOGY

## 2021-09-27 PROCEDURE — 99999 PR PBB SHADOW E&M-EST. PATIENT-LVL II: ICD-10-PCS | Mod: PBBFAC,,, | Performed by: DERMATOLOGY

## 2021-09-28 ENCOUNTER — OFFICE VISIT (OUTPATIENT)
Dept: RHEUMATOLOGY | Facility: CLINIC | Age: 61
End: 2021-09-28
Payer: OTHER GOVERNMENT

## 2021-09-28 VITALS
BODY MASS INDEX: 24.48 KG/M2 | SYSTOLIC BLOOD PRESSURE: 131 MMHG | DIASTOLIC BLOOD PRESSURE: 75 MMHG | WEIGHT: 170.63 LBS

## 2021-09-28 DIAGNOSIS — M19.90 CHRONIC INFLAMMATORY ARTHRITIS: Primary | ICD-10-CM

## 2021-09-28 DIAGNOSIS — Z79.899 ENCOUNTER FOR LONG-TERM (CURRENT) DRUG USE: ICD-10-CM

## 2021-09-28 DIAGNOSIS — M19.90 OSTEOARTHRITIS, UNSPECIFIED OSTEOARTHRITIS TYPE, UNSPECIFIED SITE: ICD-10-CM

## 2021-09-28 PROCEDURE — 99213 PR OFFICE/OUTPT VISIT, EST, LEVL III, 20-29 MIN: ICD-10-PCS | Mod: S$GLB,,, | Performed by: INTERNAL MEDICINE

## 2021-09-28 PROCEDURE — 99213 OFFICE O/P EST LOW 20 MIN: CPT | Mod: S$GLB,,, | Performed by: INTERNAL MEDICINE
